# Patient Record
Sex: FEMALE | Race: WHITE | Employment: OTHER | URBAN - METROPOLITAN AREA
[De-identification: names, ages, dates, MRNs, and addresses within clinical notes are randomized per-mention and may not be internally consistent; named-entity substitution may affect disease eponyms.]

---

## 2017-01-11 ENCOUNTER — INITIAL CONSULT (OUTPATIENT)
Dept: PSYCHOLOGY | Age: 67
End: 2017-01-11

## 2017-01-11 DIAGNOSIS — F41.9 ANXIETY: Primary | ICD-10-CM

## 2017-01-11 PROCEDURE — 90791 PSYCH DIAGNOSTIC EVALUATION: CPT | Performed by: PSYCHOLOGIST

## 2017-01-11 ASSESSMENT — PATIENT HEALTH QUESTIONNAIRE - PHQ9
3. TROUBLE FALLING OR STAYING ASLEEP: 3
1. LITTLE INTEREST OR PLEASURE IN DOING THINGS: 2
SUM OF ALL RESPONSES TO PHQ9 QUESTIONS 1 & 2: 2
5. POOR APPETITE OR OVEREATING: 0
7. TROUBLE CONCENTRATING ON THINGS, SUCH AS READING THE NEWSPAPER OR WATCHING TELEVISION: 0
4. FEELING TIRED OR HAVING LITTLE ENERGY: 2
2. FEELING DOWN, DEPRESSED OR HOPELESS: 0
8. MOVING OR SPEAKING SO SLOWLY THAT OTHER PEOPLE COULD HAVE NOTICED. OR THE OPPOSITE, BEING SO FIGETY OR RESTLESS THAT YOU HAVE BEEN MOVING AROUND A LOT MORE THAN USUAL: 0
SUM OF ALL RESPONSES TO PHQ QUESTIONS 1-9: 7
6. FEELING BAD ABOUT YOURSELF - OR THAT YOU ARE A FAILURE OR HAVE LET YOURSELF OR YOUR FAMILY DOWN: 0
9. THOUGHTS THAT YOU WOULD BE BETTER OFF DEAD, OR OF HURTING YOURSELF: 0

## 2017-02-17 ENCOUNTER — OFFICE VISIT (OUTPATIENT)
Dept: CARDIOLOGY CLINIC | Age: 67
End: 2017-02-17

## 2017-02-17 VITALS
SYSTOLIC BLOOD PRESSURE: 136 MMHG | WEIGHT: 153.4 LBS | HEART RATE: 80 BPM | DIASTOLIC BLOOD PRESSURE: 86 MMHG | BODY MASS INDEX: 24.65 KG/M2 | HEIGHT: 66 IN

## 2017-02-17 DIAGNOSIS — Z98.61 S/P PTCA (PERCUTANEOUS TRANSLUMINAL CORONARY ANGIOPLASTY): Primary | ICD-10-CM

## 2017-02-17 DIAGNOSIS — R26.81 UNSTEADINESS: ICD-10-CM

## 2017-02-17 PROCEDURE — 99214 OFFICE O/P EST MOD 30 MIN: CPT | Performed by: INTERNAL MEDICINE

## 2017-02-17 RX ORDER — HYDROXYCHLOROQUINE SULFATE 200 MG/1
TABLET, FILM COATED ORAL 2 TIMES DAILY
COMMUNITY
End: 2017-04-05

## 2017-02-22 ENCOUNTER — OFFICE VISIT (OUTPATIENT)
Dept: INTERNAL MEDICINE CLINIC | Age: 67
End: 2017-02-22

## 2017-02-22 ENCOUNTER — OFFICE VISIT (OUTPATIENT)
Dept: PSYCHOLOGY | Age: 67
End: 2017-02-22

## 2017-02-22 VITALS
HEART RATE: 95 BPM | WEIGHT: 153 LBS | RESPIRATION RATE: 16 BRPM | BODY MASS INDEX: 24.69 KG/M2 | SYSTOLIC BLOOD PRESSURE: 132 MMHG | DIASTOLIC BLOOD PRESSURE: 72 MMHG

## 2017-02-22 DIAGNOSIS — M06.9 RHEUMATOID ARTHRITIS INVOLVING MULTIPLE SITES, UNSPECIFIED RHEUMATOID FACTOR PRESENCE: ICD-10-CM

## 2017-02-22 DIAGNOSIS — M79.7 FIBROMYALGIA: ICD-10-CM

## 2017-02-22 DIAGNOSIS — F41.1 GAD (GENERALIZED ANXIETY DISORDER): Primary | ICD-10-CM

## 2017-02-22 DIAGNOSIS — I25.10 CORONARY ARTERY DISEASE INVOLVING NATIVE CORONARY ARTERY OF NATIVE HEART WITHOUT ANGINA PECTORIS: ICD-10-CM

## 2017-02-22 DIAGNOSIS — E78.2 MIXED HYPERLIPIDEMIA: ICD-10-CM

## 2017-02-22 DIAGNOSIS — I10 ESSENTIAL HYPERTENSION: ICD-10-CM

## 2017-02-22 DIAGNOSIS — F41.9 ANXIETY: Primary | ICD-10-CM

## 2017-02-22 PROCEDURE — 90832 PSYTX W PT 30 MINUTES: CPT | Performed by: PSYCHOLOGIST

## 2017-02-22 PROCEDURE — 99213 OFFICE O/P EST LOW 20 MIN: CPT | Performed by: INTERNAL MEDICINE

## 2017-02-22 RX ORDER — AZELASTINE 1 MG/ML
1 SPRAY, METERED NASAL 2 TIMES DAILY
Qty: 1 BOTTLE | Refills: 3 | Status: SHIPPED | OUTPATIENT
Start: 2017-02-22 | End: 2017-04-05

## 2017-02-22 RX ORDER — LABETALOL 300 MG/1
300 TABLET, FILM COATED ORAL 2 TIMES DAILY
Qty: 60 TABLET | Refills: 3 | Status: SHIPPED | OUTPATIENT
Start: 2017-02-22 | End: 2017-04-05

## 2017-02-22 RX ORDER — BUSPIRONE HYDROCHLORIDE 10 MG/1
10 TABLET ORAL 3 TIMES DAILY
Qty: 90 TABLET | Refills: 1 | Status: SHIPPED | OUTPATIENT
Start: 2017-02-22 | End: 2017-09-28 | Stop reason: SDUPTHER

## 2017-02-22 ASSESSMENT — ENCOUNTER SYMPTOMS
EYE PAIN: 0
ABDOMINAL PAIN: 0
SORE THROAT: 0
DIARRHEA: 0
SHORTNESS OF BREATH: 0
BACK PAIN: 0
CONSTIPATION: 0
COUGH: 0
WHEEZING: 0

## 2017-03-03 ENCOUNTER — HOSPITAL ENCOUNTER (OUTPATIENT)
Dept: GENERAL RADIOLOGY | Age: 67
Discharge: HOME | End: 2017-03-03
Attending: INTERNAL MEDICINE | Admitting: INTERNAL MEDICINE

## 2017-03-03 ENCOUNTER — PROCEDURE VISIT (OUTPATIENT)
Dept: CARDIOLOGY CLINIC | Age: 67
End: 2017-03-03

## 2017-03-03 DIAGNOSIS — Z98.61 S/P PTCA (PERCUTANEOUS TRANSLUMINAL CORONARY ANGIOPLASTY): ICD-10-CM

## 2017-03-03 DIAGNOSIS — R26.81 UNSTEADINESS: Primary | ICD-10-CM

## 2017-03-03 LAB
ALBUMIN SERPL-MCNC: 4.6 GM/DL (ref 3.4–5)
ALP BLD-CCNC: 54 IU/L (ref 40–129)
ALT SERPL-CCNC: 14 U/L (ref 10–40)
ALT SERPL-CCNC: 14 U/L (ref 10–40)
AST SERPL-CCNC: 22 IU/L (ref 15–37)
AST SERPL-CCNC: 23 IU/L (ref 15–37)
BASOPHILS ABSOLUTE: 0 K/CU MM
BASOPHILS RELATIVE PERCENT: 0.5 % (ref 0–1)
BILIRUB SERPL-MCNC: 0.2 MG/DL (ref 0–1)
BILIRUBIN DIRECT: 0.2 MG/DL (ref 0–0.3)
BILIRUBIN, INDIRECT: 0 MG/DL (ref 0–0.7)
BUN BLDV-MCNC: 7 MG/DL (ref 6–23)
CHOLESTEROL: 223 MG/DL
CREAT SERPL-MCNC: 0.8 MG/DL (ref 0.6–1.1)
DIFFERENTIAL TYPE: ABNORMAL
EOSINOPHILS ABSOLUTE: 0.1 K/CU MM
EOSINOPHILS RELATIVE PERCENT: 1.2 % (ref 0–3)
ERYTHROCYTE SEDIMENTATION RATE: 17 MM/HR (ref 0–30)
GFR AFRICAN AMERICAN: >60 ML/MIN/1.73M2
GFR NON-AFRICAN AMERICAN: >60 ML/MIN/1.73M2
HCT VFR BLD CALC: 43 % (ref 37–47)
HDLC SERPL-MCNC: 67 MG/DL
HEMOGLOBIN: 14.2 GM/DL (ref 12.5–16)
IMMATURE NEUTROPHIL %: 0.5 % (ref 0–0.43)
LDL CHOLESTEROL DIRECT: 151 MG/DL
LYMPHOCYTES ABSOLUTE: 2.9 K/CU MM
LYMPHOCYTES RELATIVE PERCENT: 33.9 % (ref 24–44)
MCH RBC QN AUTO: 34.6 PG (ref 27–31)
MCHC RBC AUTO-ENTMCNC: 33 % (ref 32–36)
MCV RBC AUTO: 104.9 FL (ref 78–100)
MONOCYTES ABSOLUTE: 0.6 K/CU MM
MONOCYTES RELATIVE PERCENT: 6.5 % (ref 0–4)
NUCLEATED RBC %: 0 %
PDW BLD-RTO: 12.3 % (ref 11.7–14.9)
PLATELET # BLD: 261 K/CU MM (ref 140–440)
PMV BLD AUTO: 9.2 FL (ref 7.5–11.1)
RBC # BLD: 4.1 M/CU MM (ref 4.2–5.4)
SEGMENTED NEUTROPHILS ABSOLUTE COUNT: 4.8 K/CU MM
SEGMENTED NEUTROPHILS RELATIVE PERCENT: 57.4 % (ref 36–66)
TOTAL CK: 119 IU/L (ref 26–140)
TOTAL IMMATURE NEUTOROPHIL: 0.04 K/CU MM
TOTAL NUCLEATED RBC: 0 K/CU MM
TOTAL PROTEIN: 7.2 GM/DL (ref 6.4–8.2)
TRIGL SERPL-MCNC: 118 MG/DL
TSH HIGH SENSITIVITY: 1.8 UIU/ML (ref 0.27–4.2)
WBC # BLD: 8.4 K/CU MM (ref 4–10.5)

## 2017-03-03 PROCEDURE — 93880 EXTRACRANIAL BILAT STUDY: CPT | Performed by: INTERNAL MEDICINE

## 2017-03-09 ENCOUNTER — TELEPHONE (OUTPATIENT)
Dept: CARDIOLOGY CLINIC | Age: 67
End: 2017-03-09

## 2017-04-04 ENCOUNTER — TELEPHONE (OUTPATIENT)
Dept: INTERNAL MEDICINE CLINIC | Age: 67
End: 2017-04-04

## 2017-04-04 ENCOUNTER — TELEPHONE (OUTPATIENT)
Dept: PSYCHOLOGY | Age: 67
End: 2017-04-04

## 2017-04-05 ENCOUNTER — OFFICE VISIT (OUTPATIENT)
Dept: INTERNAL MEDICINE CLINIC | Age: 67
End: 2017-04-05

## 2017-04-05 VITALS
WEIGHT: 151.8 LBS | HEART RATE: 85 BPM | SYSTOLIC BLOOD PRESSURE: 150 MMHG | DIASTOLIC BLOOD PRESSURE: 88 MMHG | BODY MASS INDEX: 24.5 KG/M2

## 2017-04-05 DIAGNOSIS — I10 ESSENTIAL HYPERTENSION: Primary | ICD-10-CM

## 2017-04-05 DIAGNOSIS — M79.7 FIBROMYALGIA: ICD-10-CM

## 2017-04-05 DIAGNOSIS — M06.9 RHEUMATOID ARTHRITIS INVOLVING MULTIPLE SITES, UNSPECIFIED RHEUMATOID FACTOR PRESENCE: ICD-10-CM

## 2017-04-05 DIAGNOSIS — F41.1 GAD (GENERALIZED ANXIETY DISORDER): ICD-10-CM

## 2017-04-05 DIAGNOSIS — E78.2 MIXED HYPERLIPIDEMIA: ICD-10-CM

## 2017-04-05 DIAGNOSIS — R73.9 HYPERGLYCEMIA: ICD-10-CM

## 2017-04-05 PROCEDURE — 83036 HEMOGLOBIN GLYCOSYLATED A1C: CPT | Performed by: INTERNAL MEDICINE

## 2017-04-05 PROCEDURE — 99214 OFFICE O/P EST MOD 30 MIN: CPT | Performed by: INTERNAL MEDICINE

## 2017-04-05 RX ORDER — GUAIFENESIN 600 MG/1
1200 TABLET, EXTENDED RELEASE ORAL 2 TIMES DAILY
COMMUNITY
End: 2017-09-28

## 2017-04-05 RX ORDER — HYDRALAZINE HYDROCHLORIDE 25 MG/1
25 TABLET, FILM COATED ORAL 2 TIMES DAILY
Qty: 60 TABLET | Refills: 1 | Status: SHIPPED | OUTPATIENT
Start: 2017-04-05 | End: 2017-09-28

## 2017-04-05 RX ORDER — ROSUVASTATIN CALCIUM 20 MG/1
20 TABLET, COATED ORAL DAILY
Qty: 30 TABLET | Refills: 5 | Status: SHIPPED | OUTPATIENT
Start: 2017-04-05 | End: 2017-11-10 | Stop reason: ALTCHOICE

## 2017-04-05 ASSESSMENT — ENCOUNTER SYMPTOMS
ABDOMINAL PAIN: 0
EYE PAIN: 0
SHORTNESS OF BREATH: 0
CONSTIPATION: 0
COUGH: 0
WHEEZING: 0
SORE THROAT: 0
DIARRHEA: 0
BACK PAIN: 0

## 2017-08-22 DIAGNOSIS — F41.1 GAD (GENERALIZED ANXIETY DISORDER): ICD-10-CM

## 2017-09-25 ENCOUNTER — TELEPHONE (OUTPATIENT)
Dept: CARDIOLOGY CLINIC | Age: 67
End: 2017-09-25

## 2017-09-25 RX ORDER — LOSARTAN POTASSIUM 25 MG/1
25 TABLET ORAL 2 TIMES DAILY
Qty: 180 TABLET | Refills: 3 | Status: SHIPPED | OUTPATIENT
Start: 2017-09-25 | End: 2018-10-19 | Stop reason: SDUPTHER

## 2017-09-27 ENCOUNTER — TELEPHONE (OUTPATIENT)
Dept: INTERNAL MEDICINE CLINIC | Age: 67
End: 2017-09-27

## 2017-09-28 ENCOUNTER — OFFICE VISIT (OUTPATIENT)
Dept: INTERNAL MEDICINE CLINIC | Age: 67
End: 2017-09-28

## 2017-09-28 VITALS
DIASTOLIC BLOOD PRESSURE: 80 MMHG | RESPIRATION RATE: 16 BRPM | WEIGHT: 145 LBS | OXYGEN SATURATION: 98 % | HEART RATE: 87 BPM | SYSTOLIC BLOOD PRESSURE: 168 MMHG | BODY MASS INDEX: 23.4 KG/M2

## 2017-09-28 DIAGNOSIS — I10 ESSENTIAL HYPERTENSION: Primary | ICD-10-CM

## 2017-09-28 DIAGNOSIS — E78.2 MIXED HYPERLIPIDEMIA: ICD-10-CM

## 2017-09-28 DIAGNOSIS — M54.42 CHRONIC MIDLINE LOW BACK PAIN WITH LEFT-SIDED SCIATICA: ICD-10-CM

## 2017-09-28 DIAGNOSIS — M06.9 RHEUMATOID ARTHRITIS INVOLVING MULTIPLE SITES, UNSPECIFIED RHEUMATOID FACTOR PRESENCE: ICD-10-CM

## 2017-09-28 DIAGNOSIS — G89.29 CHRONIC MIDLINE LOW BACK PAIN WITH LEFT-SIDED SCIATICA: ICD-10-CM

## 2017-09-28 DIAGNOSIS — M79.7 FIBROMYALGIA: ICD-10-CM

## 2017-09-28 DIAGNOSIS — Z23 NEED FOR INFLUENZA VACCINATION: ICD-10-CM

## 2017-09-28 DIAGNOSIS — F41.1 GAD (GENERALIZED ANXIETY DISORDER): ICD-10-CM

## 2017-09-28 DIAGNOSIS — H92.01 OTALGIA, RIGHT: ICD-10-CM

## 2017-09-28 PROCEDURE — 90471 IMMUNIZATION ADMIN: CPT | Performed by: INTERNAL MEDICINE

## 2017-09-28 PROCEDURE — 90662 IIV NO PRSV INCREASED AG IM: CPT | Performed by: INTERNAL MEDICINE

## 2017-09-28 PROCEDURE — 99214 OFFICE O/P EST MOD 30 MIN: CPT | Performed by: INTERNAL MEDICINE

## 2017-09-28 RX ORDER — ACETAMINOPHEN 500 MG
500 TABLET ORAL EVERY 8 HOURS
Qty: 120 TABLET | Refills: 3 | Status: SHIPPED | OUTPATIENT
Start: 2017-09-28 | End: 2017-11-10 | Stop reason: ALTCHOICE

## 2017-09-28 RX ORDER — IBUPROFEN 600 MG/1
600 TABLET ORAL EVERY 8 HOURS PRN
Qty: 90 TABLET | Refills: 1 | Status: SHIPPED | OUTPATIENT
Start: 2017-09-28 | End: 2019-05-22

## 2017-09-28 RX ORDER — CYCLOBENZAPRINE HCL 5 MG
5 TABLET ORAL 3 TIMES DAILY PRN
Qty: 90 TABLET | Refills: 0 | Status: SHIPPED | OUTPATIENT
Start: 2017-09-28 | End: 2017-11-10 | Stop reason: ALTCHOICE

## 2017-09-28 RX ORDER — SERTRALINE HYDROCHLORIDE 100 MG/1
100 TABLET, FILM COATED ORAL DAILY
Qty: 90 TABLET | Refills: 1 | Status: SHIPPED | OUTPATIENT
Start: 2017-09-28 | End: 2018-03-28 | Stop reason: SDUPTHER

## 2017-09-28 RX ORDER — BUSPIRONE HYDROCHLORIDE 10 MG/1
10 TABLET ORAL 3 TIMES DAILY
Qty: 90 TABLET | Refills: 3 | Status: SHIPPED | OUTPATIENT
Start: 2017-09-28 | End: 2018-03-28 | Stop reason: SDUPTHER

## 2017-09-28 ASSESSMENT — ENCOUNTER SYMPTOMS
WHEEZING: 0
DIARRHEA: 0
SORE THROAT: 0
CONSTIPATION: 0
ABDOMINAL PAIN: 0
EYE PAIN: 0
SHORTNESS OF BREATH: 0
COUGH: 0
BOWEL INCONTINENCE: 0

## 2017-09-30 ASSESSMENT — ENCOUNTER SYMPTOMS: BACK PAIN: 1

## 2017-10-06 ENCOUNTER — HOSPITAL ENCOUNTER (OUTPATIENT)
Dept: GENERAL RADIOLOGY | Age: 67
Discharge: HOME | End: 2017-10-06
Attending: INTERNAL MEDICINE | Admitting: INTERNAL MEDICINE

## 2017-10-06 DIAGNOSIS — M54.42 CHRONIC MIDLINE LOW BACK PAIN WITH LEFT-SIDED SCIATICA: ICD-10-CM

## 2017-10-06 DIAGNOSIS — G89.29 CHRONIC MIDLINE LOW BACK PAIN WITH LEFT-SIDED SCIATICA: ICD-10-CM

## 2017-11-10 ENCOUNTER — OFFICE VISIT (OUTPATIENT)
Dept: CARDIOLOGY CLINIC | Age: 67
End: 2017-11-10

## 2017-11-10 VITALS
HEART RATE: 86 BPM | SYSTOLIC BLOOD PRESSURE: 128 MMHG | DIASTOLIC BLOOD PRESSURE: 84 MMHG | HEIGHT: 67 IN | BODY MASS INDEX: 22.91 KG/M2 | WEIGHT: 146 LBS

## 2017-11-10 DIAGNOSIS — I25.119 CORONARY ARTERY DISEASE INVOLVING NATIVE CORONARY ARTERY OF NATIVE HEART WITH ANGINA PECTORIS (HCC): ICD-10-CM

## 2017-11-10 DIAGNOSIS — Z98.61 S/P PTCA (PERCUTANEOUS TRANSLUMINAL CORONARY ANGIOPLASTY): Primary | ICD-10-CM

## 2017-11-10 PROCEDURE — 99214 OFFICE O/P EST MOD 30 MIN: CPT | Performed by: INTERNAL MEDICINE

## 2017-11-10 RX ORDER — NITROGLYCERIN 400 UG/1
1 SPRAY ORAL EVERY 5 MIN PRN
Qty: 1 BOTTLE | Refills: 3 | Status: SHIPPED | OUTPATIENT
Start: 2017-11-10 | End: 2020-06-18 | Stop reason: SDUPTHER

## 2017-11-10 NOTE — PROGRESS NOTES
CARDIOLOGY NOTE      11/10/2017    RE: Faizan Pompa  (1950)                               TO:  Dr. Eric Birmingham MD      Thank you for involving me in taking care of your  patient Faizan Pompa, who is a  74y.o. year old      female with past medical  history of  CAD, HTN, hyperlipidimea  is  seen today Patient  during this  visit has mild central ch pressure , exertional , nonradiating , recurrent for a few months. .      Vitals:    11/10/17 1544   BP: 128/84   Pulse: 86       Current Outpatient Prescriptions   Medication Sig Dispense Refill    umeclidinium-vilanterol (ANORO ELLIPTA) 62.5-25 MCG/INH AEPB inhaler Inhale 1 puff into the lungs daily 3 each 3    rOPINIRole (REQUIP) 0.5 MG tablet Take 1 tablet by mouth 2 times daily 180 tablet 3    busPIRone (BUSPAR) 10 MG tablet Take 1 tablet by mouth 3 times daily 90 tablet 3    sertraline (ZOLOFT) 100 MG tablet Take 1 tablet by mouth daily 90 tablet 1    ibuprofen (ADVIL;MOTRIN) 600 MG tablet Take 1 tablet by mouth every 8 hours as needed for Pain 90 tablet 1    losartan (COZAAR) 25 MG tablet Take 1 tablet by mouth 2 times daily 180 tablet 3    guaiFENesin (MUCINEX) 600 MG extended release tablet Take 1 tablet by mouth 2 times daily 60 tablet 5    ipratropium-albuterol (DUONEB) 0.5-2.5 (3) MG/3ML SOLN nebulizer solution Inhale 3 mLs into the lungs every 6 hours as needed for Shortness of Breath 360 mL 6    aspirin 81 MG EC tablet Take 81 mg by mouth daily.  nitroGLYCERIN (NITROSTAT) 0.4 MG SL tablet Place 0.4 mg under the tongue every 5 minutes as needed. No current facility-administered medications for this visit. Allergies: Codeine; Elavil [amitriptyline]; Gabapentin; Morphine;  Other; Percocet [oxycodone-acetaminophen]; and Trazodone and nefazodone  Past Medical History:   Diagnosis Date    CAD (coronary artery disease)     [facility] [address]     H/O bilateral oophorectomy ~2006    H/O cardiovascular stress test 04/21/2014    EF 70%, no ischemia, normal LV systolic function, normal perfusion pattern.  H/O cardiovascular stress test 4/16/2015    treadmill    H/O Doppler ultrasound 03/03/2017    carotid - bilateral disease 0-49%    Hx of cardiovascular stress test 11/2011    EF70%    Hx of Doppler ultrasound 10/2012    peripheral duplex normal    Hx of echocardiogram 10/14/2011    EF>55%    Hyperlipidemia     Hypertension     Pulmonary emboli ([address] ~1997 - 80    post op    S/P PTCA (percutaneous transluminal coronary angioplasty) 10/26/2011    ptca with stent to LAD     Past Surgical History:   Procedure Laterality Date    BREAST LUMPECTOMY      COLONOSCOPY  08/29/2016    1 colon polyp    CORONARY ANGIOPLASTY  10826/2011    ptca with stent to LAD    DIAGNOSTIC CARDIAC CATH LAB PROCEDURE  2001    POLYPECTOMY      ROTATOR CUFF REPAIR Left     SMALL INTESTINE SURGERY      JENNY AND BSO       Family History   Problem Relation Age of Onset    Liver Disease Mother     Heart Disease Mother     Liver Cancer Sister      Alcoholic    Cancer Sister      Breast    Stroke Sister      Social History   Substance Use Topics    Smoking status: Current Some Day Smoker     Packs/day: 0.25     Years: 43.00     Types: Cigarettes     Last attempt to quit: 9/1/2014    Smokeless tobacco: Never Used      Comment: Currently 0.5 PPD  Reviewed 2/17/17    Alcohol use 7.2 oz/week     12 Cans of beer per week          Review of systems:  HEENT: Neg  Card:cp   GI;Neg  : Neg  Neuro: Neg  Psych: Neg  Derm: Neg  MS; Neg  All: Documented  Constitutional: Neg    Objective:      Physical Exam:  /84   Pulse 86   Ht 5' 7\" (1.702 m)   Wt 146 lb (66.2 kg)   BMI 22.87 kg/m²   Wt Readings from Last 3 Encounters:   11/10/17 146 lb (66.2 kg)   10/31/17 142 lb (64.4 kg)   09/28/17 145 lb (65.8 kg)     Body mass index is 22.87 kg/m². GENERAL - Alert, oriented, pleasant, in no apparent distress.     Head unremarkable  Eyes  Not injected conjunctiva  ENT  normal mucosa  Neck - Supple. No jugular venous distention noted. No carotid bruits. Cardiovascular  Normal S1 and S2 without obvious murmur or gallop. Extremities - No cyanosis, clubbing, or significant edema. Pulmonary  No respiratory distress. No wheezes or rales. Pulses: Bilateral radial and pedal pulses normal  Abdomen  no tenderness  Musculoskeletal  normal strength  Neurologic    There are  no gross focal neurologic abnormalities. Skin-  No rash  Affect; normal mood    DATA:  Lab Results   Component Value Date    CKTOTAL 119 03/03/2017     BNP:  No results found for: BNP  PT/INR:  No results found for: [employer]  Lab Results   Component Value Date    LABA1C 6.0 03/11/2016    LABA1C 5.5 09/03/2014     Lab Results   Component Value Date    CHOL 223 (H) 03/03/2017    TRIG 118 03/03/2017    HDL 67 03/03/2017    LDLDIRECT 151 (H) 03/03/2017     Lab Results   Component Value Date    ALT 14 03/03/2017    AST 22 03/03/2017     TSH:  No results found for: TSH       QUALITY MEASURES:  CAD:  Yes   CHOL LOWERING:  Yes- if No Why  ANTIPLATELET:  Yes - if No why  BETA BLOCKER    yes,   IF  NO WHY  SMOKING HISTORY no COUNSELLED no  ATRIAL FIBRILLATIONno ANTICOAG: no    Assessment/ Plan:     Patient seen , interviewed and examined                 -     CORONARY ARTERY DISEASE: Patient  currently as per anginal classification has   CCS I - Angina only during strenuous or prolonged physical activity           - changes in  treatment:   no  All CAD tests available in records reviewed. -  Hypertension: Patients blood pressure is normal. Patient is advised about low sodium diet. Present medical regimen will not be changed. -  LIPID MANAGEMENT:  Available lipid  lab data reviewed  and patient was given dietary advice. NCEP- ATP III guidelines reviewed with patient. -   Changes  in medicines made:  No                         PT HAS PCI AND HAS CP NOW.

## 2017-11-21 ENCOUNTER — PROCEDURE VISIT (OUTPATIENT)
Dept: CARDIOLOGY CLINIC | Age: 67
End: 2017-11-21

## 2017-11-21 DIAGNOSIS — Z98.61 S/P PTCA (PERCUTANEOUS TRANSLUMINAL CORONARY ANGIOPLASTY): ICD-10-CM

## 2017-11-21 DIAGNOSIS — I25.119 CORONARY ARTERY DISEASE INVOLVING NATIVE CORONARY ARTERY OF NATIVE HEART WITH ANGINA PECTORIS (HCC): ICD-10-CM

## 2017-11-21 LAB
LV EF: 75 %
LVEF MODALITY: NORMAL

## 2017-11-21 PROCEDURE — 93015 CV STRESS TEST SUPVJ I&R: CPT | Performed by: INTERNAL MEDICINE

## 2017-11-21 PROCEDURE — A9500 TC99M SESTAMIBI: HCPCS | Performed by: INTERNAL MEDICINE

## 2017-11-21 PROCEDURE — 78452 HT MUSCLE IMAGE SPECT MULT: CPT | Performed by: INTERNAL MEDICINE

## 2017-11-22 ENCOUNTER — TELEPHONE (OUTPATIENT)
Dept: CARDIOLOGY CLINIC | Age: 67
End: 2017-11-22

## 2017-11-22 NOTE — TELEPHONE ENCOUNTER
Left message on voice mail for patient to return call regarding results of stress [facility] physician Dr. Ventura Olguin shows no ischemia but treadmill shows ST depression which improved    during rest    ECG portion of stress test is positive for ischemia by diagnostic criteria.    1 Mm ST depression seen in V5 and V6 , completed 7 METS and 6 Mins of    exercise    No infarct or ischemia noted.    Normal EF 75 % with normal ventricular contractility.    Normal stress images and Normal stress test        Recommendation    clinical correlation needed , Normal stress images but abnormal Treadmill    stress ( low risk)

## 2017-11-27 ENCOUNTER — TELEPHONE (OUTPATIENT)
Dept: INTERNAL MEDICINE CLINIC | Age: 67
End: 2017-11-27

## 2017-11-30 ENCOUNTER — OFFICE VISIT (OUTPATIENT)
Dept: CARDIOLOGY CLINIC | Age: 67
End: 2017-11-30

## 2017-11-30 VITALS
HEART RATE: 84 BPM | HEIGHT: 67 IN | BODY MASS INDEX: 23.04 KG/M2 | DIASTOLIC BLOOD PRESSURE: 80 MMHG | WEIGHT: 146.8 LBS | SYSTOLIC BLOOD PRESSURE: 138 MMHG

## 2017-11-30 DIAGNOSIS — Z98.61 S/P PTCA (PERCUTANEOUS TRANSLUMINAL CORONARY ANGIOPLASTY): Primary | ICD-10-CM

## 2017-11-30 DIAGNOSIS — I25.119 CORONARY ARTERY DISEASE INVOLVING NATIVE CORONARY ARTERY OF NATIVE HEART WITH ANGINA PECTORIS (HCC): ICD-10-CM

## 2017-11-30 PROCEDURE — 99214 OFFICE O/P EST MOD 30 MIN: CPT | Performed by: INTERNAL MEDICINE

## 2018-03-01 ENCOUNTER — OFFICE VISIT (OUTPATIENT)
Dept: CARDIOLOGY CLINIC | Age: 68
End: 2018-03-01

## 2018-03-01 VITALS
DIASTOLIC BLOOD PRESSURE: 82 MMHG | WEIGHT: 147.8 LBS | SYSTOLIC BLOOD PRESSURE: 132 MMHG | BODY MASS INDEX: 23.75 KG/M2 | HEIGHT: 66 IN | HEART RATE: 88 BPM

## 2018-03-01 DIAGNOSIS — Z98.61 S/P PTCA (PERCUTANEOUS TRANSLUMINAL CORONARY ANGIOPLASTY): Primary | ICD-10-CM

## 2018-03-01 PROCEDURE — 99213 OFFICE O/P EST LOW 20 MIN: CPT | Performed by: INTERNAL MEDICINE

## 2018-03-01 NOTE — PROGRESS NOTES
CARDIOLOGY NOTE      3/1/2018    RE: Thony Muñoz  (1950)                               TO:  Dr. Evie Cavazos MD      Thank you for involving me in taking care of your  patient Thony Muñoz, who is a  74y.o. year old      female with past medical  history of  CAD, HTN, hyperlipidimea  is  seen today  Pt still has mild exertional CP with mild SOB  For a few weeks. Vitals:    03/01/18 1504   BP: 132/82   Pulse: 88       Current Outpatient Prescriptions   Medication Sig Dispense Refill    Varenicline Tartrate (CHANTIX PO) Take by mouth      umeclidinium-vilanterol (ANORO ELLIPTA) 62.5-25 MCG/INH AEPB inhaler Inhale 1 puff into the lungs daily 3 each 3    rOPINIRole (REQUIP) 0.5 MG tablet Take 1 tablet by mouth 2 times daily 180 tablet 3    nitroGLYCERIN (NITROLINGUAL) 0.4 MG/SPRAY 0.4 mg spray Place 1 spray under the tongue every 5 minutes as needed for Chest pain 1 Bottle 3    busPIRone (BUSPAR) 10 MG tablet Take 1 tablet by mouth 3 times daily 90 tablet 3    sertraline (ZOLOFT) 100 MG tablet Take 1 tablet by mouth daily 90 tablet 1    ibuprofen (ADVIL;MOTRIN) 600 MG tablet Take 1 tablet by mouth every 8 hours as needed for Pain 90 tablet 1    losartan (COZAAR) 25 MG tablet Take 1 tablet by mouth 2 times daily 180 tablet 3    guaiFENesin (MUCINEX) 600 MG extended release tablet Take 1 tablet by mouth 2 times daily 60 tablet 5    aspirin 81 MG EC tablet Take 81 mg by mouth daily. No current facility-administered medications for this visit. Allergies: Codeine; Elavil [amitriptyline]; Gabapentin; Morphine; Other; Percocet [oxycodone-acetaminophen]; and Trazodone and nefazodone  Past Medical History:   Diagnosis Date    CAD (coronary artery disease)     [facility]congestive heart failure) ([facility] [address]     H/O bilateral oophorectomy ~2006    H/O cardiovascular stress test 04/21/2014    EF 70%, no ischemia, normal LV systolic function, normal perfusion pattern.     H/O oriented, pleasant, in no apparent distress. Head unremarkable  Eyes  Not injected conjunctiva  ENT  normal mucosa  Neck - Supple. No jugular venous distention noted. No carotid bruits. Cardiovascular  Normal S1 and S2 without obvious murmur or gallop. Extremities - No cyanosis, clubbing, or significant edema. Pulmonary  No respiratory distress. No wheezes or rales. Pulses: Bilateral radial and pedal pulses normal  Abdomen  no tenderness  Musculoskeletal  normal strength  Neurologic    There are  no gross focal neurologic abnormalities. Skin-  No rash  Affect; normal mood    DATA:  Lab Results   Component Value Date    CKTOTAL 119 03/03/2017     BNP:  No results found for: BNP  PT/INR:  No results found for: PTINR  Lab Results   Component Value Date    LABA1C 6.0 03/11/2016    LABA1C 5.5 09/03/2014     Lab Results   Component Value Date    CHOL 223 (H) 03/03/2017    TRIG 118 03/03/2017    HDL 67 03/03/2017    LDLDIRECT 151 (H) 03/03/2017     Lab Results   Component Value Date    ALT 14 03/03/2017    AST 22 03/03/2017     TSH:  No results found for: TSH           Assessment/ Plan:     Patient seen , interviewed and examined                 -     CORONARY ARTERY DISEASE: Patient  currently as per anginal classification has no CP           - changes in  treatment:   no  All CAD tests available in records reviewed. -  Hypertension: Patients blood pressure is normal. Patient is advised about low sodium diet. Present medical regimen will not be changed. -  LIPID MANAGEMENT:  Available lipid  lab data reviewed  and patient was given dietary advice. NCEP- ATP III guidelines reviewed with patient. -   Changes  in medicines made: No               - Schedule [facility]  .

## 2018-03-01 NOTE — LETTER
Manju Strange     LEFT HEART CATHETERIZATION WITH POSSIBLE PERCUTANEOUS CORONARY INTERVENTION     Patient Name: Henri Hurst   : 1950   MRN# [id number]    Date of Procedure: 3/8/18 Time: 2pm Arrival Time: 12pm    The catheterization and angiogram are usually outpatient procedures, however if stenting is needed you will stay overnight. You will need to be at the hospital two hours before the procedure. You will need to arrange for someone to drive you home. You will go to registration in the main lobby. HOSPITAL:  [facility])  Call to Pre-[place] at: [phone] 1-2 days before your procedure. Please have blood work and chest-x-ray done 1 to 2 days before procedure at    [facility]. X Please do not have anything by mouth after midnight prior to or 8 hours before the procedure. X You may take your medications with a sip of water in the morning before your procedure or  take them with you. Patient Signature:  _________________________ Staff Signature: Maximino Martinez     Staff Given Instructions:_______________________________                                        Manju Strange     Patient Name: Henri Hurst   : 1950   MRN# [id number]    Date of Procedure: 3/8/18 Time: 2pm      LEFT HEART CATHETERIZATION WITH POSSIBLE PERCUTANEOUS CORONARY INTERVENTION        X Chest x-Ray PA & Lateral View        X Type & Screen     X CBC  X BMP  X PT  X PTT            ? PLEASE CALL ABNORMAL RESULTS TO THE  PHYSICIAN? ATTENTION PATIENT: Pretesting is to be done before the cath. You do not have to fast for the lab work. You must go to the [facility] behind [facility] at [address] Mahendra Ambrose to have this lab work done.   Phone: [phone] Hours: 7:00 am to 5:00 pm       PHYSICIAN SIGNATURE:      DATE:

## 2018-03-01 NOTE — PROGRESS NOTES
Pt here in office & educated on 3/1/18 for Dx: chest pain and shortness of breath, scheduled for [facility] 3/8/18 @ 2pm, w/arrival @ 12pm, @ [facility]; risks explained; & consents signed. Pre-admission orders given to pt for labs & CXR due 3/6/18 @ [facility]. Instructions given to pt to:  NPO after midnight night before procedure; Call hospital @ [phone] to pre-register; May take rest of morning meds. am of procedure; & pt voiced understanding. Copies of consent, pre-testing orders, & info. sheet given to [facility] for scanning.

## 2018-03-01 NOTE — PATIENT INSTRUCTIONS
Please remember to bring all medication bottles or a medication list with you to your appointment. If you have any questions, please call our office at [phone].

## 2018-03-06 ENCOUNTER — HOSPITAL ENCOUNTER (OUTPATIENT)
Dept: GENERAL RADIOLOGY | Age: 68
Discharge: HOME | End: 2018-03-06
Attending: INTERNAL MEDICINE | Admitting: INTERNAL MEDICINE

## 2018-03-06 DIAGNOSIS — Z01.811 PRE-OP CHEST EXAM: ICD-10-CM

## 2018-03-06 LAB
ANION GAP SERPL CALCULATED.3IONS-SCNC: 13 MMOL/L (ref 4–16)
APTT: 27.4 SECONDS (ref 21.2–33)
BASOPHILS ABSOLUTE: 0.1 K/CU MM
BASOPHILS RELATIVE PERCENT: 0.6 % (ref 0–1)
BUN BLDV-MCNC: 11 MG/DL (ref 6–23)
CALCIUM SERPL-MCNC: 9 MG/DL (ref 8.3–10.6)
CHLORIDE BLD-SCNC: 93 MMOL/L (ref 99–110)
CO2: 26 MMOL/L (ref 21–32)
CREAT SERPL-MCNC: 0.9 MG/DL (ref 0.6–1.1)
DIFFERENTIAL TYPE: ABNORMAL
EOSINOPHILS ABSOLUTE: 0.2 K/CU MM
EOSINOPHILS RELATIVE PERCENT: 1.8 % (ref 0–3)
GFR AFRICAN AMERICAN: >60 ML/MIN/1.73M2
GFR NON-AFRICAN AMERICAN: >60 ML/MIN/1.73M2
GLUCOSE BLD-MCNC: 99 MG/DL (ref 70–99)
HCT VFR BLD CALC: 39.4 % (ref 37–47)
HEMOGLOBIN: 13.7 GM/DL (ref 12.5–16)
IMMATURE NEUTROPHIL %: 0.2 % (ref 0–0.43)
INR BLD: 0.91 INDEX
LYMPHOCYTES ABSOLUTE: 2.7 K/CU MM
LYMPHOCYTES RELATIVE PERCENT: 27.2 % (ref 24–44)
MCH RBC QN AUTO: 35.6 PG (ref 27–31)
MCHC RBC AUTO-ENTMCNC: 34.8 % (ref 32–36)
MCV RBC AUTO: 102.3 FL (ref 78–100)
MONOCYTES ABSOLUTE: 0.7 K/CU MM
MONOCYTES RELATIVE PERCENT: 6.8 % (ref 0–4)
NUCLEATED RBC %: 0 %
PDW BLD-RTO: 11.9 % (ref 11.7–14.9)
PLATELET # BLD: 265 K/CU MM (ref 140–440)
PMV BLD AUTO: 9 FL (ref 7.5–11.1)
POTASSIUM SERPL-SCNC: 4.2 MMOL/L (ref 3.5–5.1)
PROTHROMBIN TIME: 10.4 SECONDS (ref 9.12–12.5)
RBC # BLD: 3.85 M/CU MM (ref 4.2–5.4)
SEGMENTED NEUTROPHILS ABSOLUTE COUNT: 6.2 K/CU MM
SEGMENTED NEUTROPHILS RELATIVE PERCENT: 63.4 % (ref 36–66)
SODIUM BLD-SCNC: 132 MMOL/L (ref 135–145)
TOTAL IMMATURE NEUTOROPHIL: 0.02 K/CU MM
TOTAL NUCLEATED RBC: 0 K/CU MM
WBC # BLD: 9.8 K/CU MM (ref 4–10.5)

## 2018-03-08 PROBLEM — I25.10 ASCVD (ARTERIOSCLEROTIC CARDIOVASCULAR DISEASE): Status: ACTIVE | Noted: 2018-03-08

## 2018-03-14 ENCOUNTER — OFFICE VISIT (OUTPATIENT)
Dept: CARDIOLOGY CLINIC | Age: 68
End: 2018-03-14

## 2018-03-14 VITALS
DIASTOLIC BLOOD PRESSURE: 82 MMHG | HEART RATE: 69 BPM | BODY MASS INDEX: 23.73 KG/M2 | WEIGHT: 147 LBS | SYSTOLIC BLOOD PRESSURE: 132 MMHG

## 2018-03-14 DIAGNOSIS — I25.10 CORONARY ARTERY DISEASE, ANGINA PRESENCE UNSPECIFIED, UNSPECIFIED VESSEL OR LESION TYPE, UNSPECIFIED WHETHER NATIVE OR TRANSPLANTED HEART: Primary | ICD-10-CM

## 2018-03-14 PROCEDURE — 93000 ELECTROCARDIOGRAM COMPLETE: CPT | Performed by: INTERNAL MEDICINE

## 2018-03-14 PROCEDURE — 99213 OFFICE O/P EST LOW 20 MIN: CPT | Performed by: INTERNAL MEDICINE

## 2018-03-14 NOTE — PROGRESS NOTES
CARDIOLOGY NOTE      3/14/2018    RE: Eleanor German  (1950)                               TO:  Dr. Caitlin Enrique MD      Thank you for involving me in taking care of your  patient Eleanor German, who is a  74y.o. year old      female with past medical  history of  CAD, HTN, hyperlipidimea  is  seen today   SP  PCI of LAD and RCA. Vitals:    03/14/18 1113   BP: 132/82   Pulse:        Current Outpatient Prescriptions   Medication Sig Dispense Refill    prasugrel (EFFIENT) 10 MG TABS Take 1 tablet by mouth daily 90 tablet 3    Varenicline Tartrate (CHANTIX PO) Take by mouth      umeclidinium-vilanterol (ANORO ELLIPTA) 62.5-25 MCG/INH AEPB inhaler Inhale 1 puff into the lungs daily 3 each 3    rOPINIRole (REQUIP) 0.5 MG tablet Take 1 tablet by mouth 2 times daily 180 tablet 3    busPIRone (BUSPAR) 10 MG tablet Take 1 tablet by mouth 3 times daily 90 tablet 3    sertraline (ZOLOFT) 100 MG tablet Take 1 tablet by mouth daily 90 tablet 1    losartan (COZAAR) 25 MG tablet Take 1 tablet by mouth 2 times daily 180 tablet 3    guaiFENesin (MUCINEX) 600 MG extended release tablet Take 1 tablet by mouth 2 times daily 60 tablet 5    aspirin 81 MG EC tablet Take 81 mg by mouth daily.  nitroGLYCERIN (NITROLINGUAL) 0.4 MG/SPRAY 0.4 mg spray Place 1 spray under the tongue every 5 minutes as needed for Chest pain 1 Bottle 3    ibuprofen (ADVIL;MOTRIN) 600 MG tablet Take 1 tablet by mouth every 8 hours as needed for Pain 90 tablet 1     No current facility-administered medications for this visit. Allergies: Codeine; Elavil [amitriptyline]; Gabapentin; Morphine;  Other; Percocet [oxycodone-acetaminophen]; and Trazodone and nefazodone  Past Medical History:   Diagnosis Date    CAD (coronary artery disease)     CHF (congestive heart failure) ([facility] [address]     H/O bilateral oophorectomy ~2006    H/O cardiovascular stress test 04/21/2014    EF 70%, no ischemia, normal LV systolic function, normal

## 2018-03-22 ENCOUNTER — PROCEDURE VISIT (OUTPATIENT)
Dept: CARDIOLOGY CLINIC | Age: 68
End: 2018-03-22

## 2018-03-22 VITALS
DIASTOLIC BLOOD PRESSURE: 70 MMHG | WEIGHT: 147 LBS | HEIGHT: 66 IN | BODY MASS INDEX: 23.63 KG/M2 | HEART RATE: 85 BPM | SYSTOLIC BLOOD PRESSURE: 132 MMHG

## 2018-03-22 DIAGNOSIS — I25.10 CORONARY ARTERY DISEASE, ANGINA PRESENCE UNSPECIFIED, UNSPECIFIED VESSEL OR LESION TYPE, UNSPECIFIED WHETHER NATIVE OR TRANSPLANTED HEART: ICD-10-CM

## 2018-03-22 DIAGNOSIS — Z98.61 S/P PTCA (PERCUTANEOUS TRANSLUMINAL CORONARY ANGIOPLASTY): ICD-10-CM

## 2018-03-22 PROCEDURE — 93015 CV STRESS TEST SUPVJ I&R: CPT | Performed by: INTERNAL MEDICINE

## 2018-03-22 RX ORDER — VARENICLINE TARTRATE 25 MG
KIT ORAL
Qty: 53 TABLET | Refills: 3 | Status: SHIPPED | OUTPATIENT
Start: 2018-03-22 | End: 2018-07-02

## 2018-03-22 NOTE — PROGRESS NOTES
GXT completed. Pt also requested if  would be prescribe her on Chantix. Asked him when he reviewed her GXT EKGs & okay to refill. Informed pt of this & she voiced understanding. Electronically submitted Chantix starter dosage Rx to [facility]. per  & pt's request.

## 2018-03-28 ENCOUNTER — HOSPITAL ENCOUNTER (OUTPATIENT)
Dept: GENERAL RADIOLOGY | Age: 68
Discharge: HOME | End: 2018-03-28
Attending: FAMILY MEDICINE | Admitting: FAMILY MEDICINE

## 2018-03-28 ENCOUNTER — OFFICE VISIT (OUTPATIENT)
Dept: INTERNAL MEDICINE CLINIC | Age: 68
End: 2018-03-28

## 2018-03-28 VITALS
BODY MASS INDEX: 23.63 KG/M2 | HEART RATE: 85 BPM | SYSTOLIC BLOOD PRESSURE: 136 MMHG | DIASTOLIC BLOOD PRESSURE: 88 MMHG | WEIGHT: 146.4 LBS | OXYGEN SATURATION: 96 % | RESPIRATION RATE: 14 BRPM

## 2018-03-28 DIAGNOSIS — F41.1 GAD (GENERALIZED ANXIETY DISORDER): ICD-10-CM

## 2018-03-28 DIAGNOSIS — R61 NIGHT SWEATS: ICD-10-CM

## 2018-03-28 DIAGNOSIS — I10 ESSENTIAL HYPERTENSION: ICD-10-CM

## 2018-03-28 DIAGNOSIS — M06.9 RHEUMATOID ARTHRITIS INVOLVING MULTIPLE SITES, UNSPECIFIED RHEUMATOID FACTOR PRESENCE: Primary | ICD-10-CM

## 2018-03-28 LAB
ALBUMIN SERPL-MCNC: 4.4 GM/DL (ref 3.4–5)
ALP BLD-CCNC: 55 IU/L (ref 40–129)
ALT SERPL-CCNC: 11 U/L (ref 10–40)
ANION GAP SERPL CALCULATED.3IONS-SCNC: 11 MMOL/L (ref 4–16)
AST SERPL-CCNC: 20 IU/L (ref 15–37)
BILIRUB SERPL-MCNC: 0.2 MG/DL (ref 0–1)
BUN BLDV-MCNC: 8 MG/DL (ref 6–23)
CALCIUM SERPL-MCNC: 9.4 MG/DL (ref 8.3–10.6)
CHLORIDE BLD-SCNC: 97 MMOL/L (ref 99–110)
CO2: 27 MMOL/L (ref 21–32)
CREAT SERPL-MCNC: 0.9 MG/DL (ref 0.6–1.1)
FOLLICLE STIMULATING HORMONE: 45.1 MLU/ML
GFR AFRICAN AMERICAN: >60 ML/MIN/1.73M2
GFR NON-AFRICAN AMERICAN: >60 ML/MIN/1.73M2
GLUCOSE BLD-MCNC: 85 MG/DL (ref 70–99)
LH: 25.8 MIU/L
POTASSIUM SERPL-SCNC: 4.3 MMOL/L (ref 3.5–5.1)
PROLACTIN: 36.2 NG/ML
SODIUM BLD-SCNC: 135 MMOL/L (ref 135–145)
TOTAL PROTEIN: 7.3 GM/DL (ref 6.4–8.2)
TSH HIGH SENSITIVITY: 2.47 UIU/ML (ref 0.27–4.2)

## 2018-03-28 PROCEDURE — 99214 OFFICE O/P EST MOD 30 MIN: CPT | Performed by: FAMILY MEDICINE

## 2018-03-28 RX ORDER — SERTRALINE HYDROCHLORIDE 100 MG/1
100 TABLET, FILM COATED ORAL DAILY
Qty: 90 TABLET | Refills: 1 | Status: SHIPPED | OUTPATIENT
Start: 2018-03-28 | End: 2018-11-05 | Stop reason: SDUPTHER

## 2018-03-28 RX ORDER — CYCLOBENZAPRINE HCL 5 MG
5 TABLET ORAL NIGHTLY
Qty: 90 TABLET | Refills: 0 | Status: SHIPPED | OUTPATIENT
Start: 2018-03-28 | End: 2018-12-17

## 2018-03-28 RX ORDER — BUSPIRONE HYDROCHLORIDE 10 MG/1
10 TABLET ORAL 3 TIMES DAILY
Qty: 90 TABLET | Refills: 1 | Status: SHIPPED | OUTPATIENT
Start: 2018-03-28 | End: 2018-11-05 | Stop reason: SDUPTHER

## 2018-03-28 ASSESSMENT — ENCOUNTER SYMPTOMS
NAUSEA: 0
EYE DISCHARGE: 0
BLOOD IN STOOL: 0
DIARRHEA: 0
SORE THROAT: 0
SHORTNESS OF BREATH: 0
VOMITING: 0
SINUS PRESSURE: 0
BACK PAIN: 1
COUGH: 0

## 2018-03-28 ASSESSMENT — PATIENT HEALTH QUESTIONNAIRE - PHQ9
1. LITTLE INTEREST OR PLEASURE IN DOING THINGS: 0
2. FEELING DOWN, DEPRESSED OR HOPELESS: 0
SUM OF ALL RESPONSES TO PHQ QUESTIONS 1-9: 0
SUM OF ALL RESPONSES TO PHQ9 QUESTIONS 1 & 2: 0

## 2018-03-28 NOTE — PROGRESS NOTES
Brendon Adames  1950  74 y.o. SUBJECT MIGUELINA:    Chief Complaint   Patient presents with    Medication Refill     pt would like to start Flexeril again for back pain     Other     wants to discuss X-ray results      Patient in for results o her x-rays. Sh is using ibuprofen every once in a while. Patient has been taking flexeril. HPI  Patient in to establish care with new provider. She has a past medical history of hypertension, hyperlipidemia, CHF, coronary artery disease she is status post a PTCA. He should also with generalized anxiety disorder. She presents today wanting refills on her Flexeril for her back. As a history of arthritis and osteopenia. Patient reports she has not seen a rheumatologist in more than a year and she's been off of steroids and methotrexate since that time. Patient had x-rays done last October per Dr. Josiah Rodriguez that showed degenerative disc disease and osteopenia of her lumbar spine. She did not get results of the x-rays that she was unable to follow-up when she showed up for her appointment she was late. Patient is requesting refill on her Zoloft as well. He denies any suicidal or homicidal ideations. Patient also complained of night sweats that she's had for the last couple months. She has gone through menopausal symptoms already. Review of Systems   Constitutional: Positive for fatigue. Negative for chills and fever. HENT: Negative for congestion, ear pain, sinus pressure and sore throat. Eyes: Negative for discharge and visual disturbance. Respiratory: Negative for cough and shortness of breath. Cardiovascular: Negative for chest pain and leg swelling. Gastrointestinal: Negative for blood in stool, diarrhea, nausea and vomiting. Endocrine: Negative for polydipsia. Genitourinary: Negative for dysuria, frequency and hematuria. Musculoskeletal: Positive for arthralgias and back pain. Negative for gait problem. Skin: Negative for rash. Allergic/Immunologic: Negative for environmental allergies and food allergies. Neurological: Negative for dizziness and headaches. Psychiatric/Behavioral: Positive for sleep disturbance. Negative for behavioral problems and suicidal ideas. The patient is nervous/anxious. Past Medical, Family, and Social History have been reviewed today. OBJECTIVE:     /88   Pulse 85   Resp 14   Wt 146 lb 6.4 oz (66.4 kg)   SpO2 96%   BMI 23.63 kg/m²         Physical Exam   Constitutional: She is oriented to person, place, and time. She appears well-developed and well-nourished. HENT:   Head: Normocephalic and atraumatic. Right Ear: External ear normal.   Left Ear: External ear normal.   Nose: Nose normal.   Mouth/Throat: Oropharynx is clear and moist.   Eyes: Conjunctivae and EOM are normal. Pupils are equal, round, and reactive to light. No scleral icterus. Neck: Normal range of motion. Neck supple. Cardiovascular: Normal rate, regular rhythm, normal heart sounds and intact distal pulses. Exam reveals no gallop and no friction rub. No murmur heard. Pulmonary/Chest: Effort normal. No respiratory distress. She has decreased breath sounds. She has no wheezes. She has no rales. Musculoskeletal: Normal range of motion. Neurological: She is alert and oriented to person, place, and time. Skin: Skin is warm and dry. No rash noted. Psychiatric: She has a normal mood and affect. Her behavior is normal. Judgment and thought content normal.       Controlled Substances Monitoring: The Prescription Monitoring Report for this patient was reviewed today. (Jojo Mora MD)    No signs of potential drug abuse or diversion identified. Jojo Mora MD)        ASSESSMENT/ PLAN:    1. Rheumatoid arthritis involving multiple sites, unspecified rheumatoid factor presence ([facility] [address]  Patient has not been seen a rheumatologist for more than a year.   She will continue with Tylenol and will add Flexeril at bedtime. Patient has been advised that she'll need to be seen every 90 days to continue this regimen. - cyclobenzaprine (FLEXERIL) 5 MG tablet; Take 1 tablet by mouth nightly  Dispense: 90 tablet; Refill: 0    2. Essential hypertension  Blood pressure is at goal.  We'll continue current medications. Patient's on Cozaar daily. 3. STANLEY (generalized anxiety disorder)  Patient requests refill on her BuSpar and Zoloft today have ordered these with refills. Patient denies any suicidal or homicidal ideation.  - busPIRone (BUSPAR) 10 MG tablet; Take 1 tablet by mouth 3 times daily  Dispense: 90 tablet; Refill: 1  - sertraline (ZOLOFT) 100 MG tablet; Take 1 tablet by mouth daily  Dispense: 90 tablet; Refill: 1    4. Night sweats  We'll get some lab studies to evaluate the night sweats the patients having periods discussed with her it is not safe to resume estrogens after she is already gone through menopause. We'll advise her of results when available. Discussed the possibility of Cymbalta her Lexapro to help with some of her symptoms as well. - TSH with Reflex; Future  - COMPREHENSIVE METABOLIC PANEL; Future  - FOLLICLE STIMULATING HORMONE; Future  - PROLACTIN; Future  - LUTEINIZING HORMONE; Future        Orders Placed This Encounter   Procedures    TSH with Reflex     Standing Status:   Future     Standing Expiration Date:   3/28/2019    COMPREHENSIVE METABOLIC PANEL     Standing Status:   Future     Standing Expiration Date:   0/25/4758    FOLLICLE STIMULATING HORMONE     Standing Status:   Future     Standing Expiration Date:   3/28/2019    PROLACTIN     Standing Status:   Future     Standing Expiration Date:   3/28/2019    LUTEINIZING HORMONE     Standing Status:   Future     Standing Expiration Date:   3/28/2019       No Follow-up on file.

## 2018-03-28 NOTE — PATIENT INSTRUCTIONS
buy these pads in sports or clothing stores. · Let your shoes dry out for a day after wearing them. If possible, set them in a place where the sun will shine on them. That will help kill the bacteria that cause the smell. · Change your socks at least 1 time a day. Wash your socks after each wearing. · Use foot powder or talc in your shoes and socks and on your feet. Put inserts in your shoes to absorb some of the sweat. Go barefoot for a while each day to let your feet dry out. · Limit hot drinks, such as coffee and tea, which make you sweat more. When should you call for help? Watch closely for changes in your health, and be sure to contact your doctor if:  ? · You continue to sweat too much, and it bothers you. Where can you learn more? Go to https://Riiidpepiceweb.Findline. org and sign in to your [employer] account. Enter C587 in the [employer] box to learn more about \"Abnormal Sweating: Care Instructions. \"     If you do not have an account, please click on the \"Sign Up Now\" link. Current as of: March 20, 2017  Content Version: 11.5  © 7542-2428 [employer]wise, [employer]. Care instructions adapted under license by [facility] ([facility]). If you have questions about a medical condition or this instruction, always ask your healthcare professional. [address] any warranty or liability for your use of this information.

## 2018-04-02 ENCOUNTER — TELEPHONE (OUTPATIENT)
Dept: INTERNAL MEDICINE CLINIC | Age: 68
End: 2018-04-02

## 2018-05-07 ENCOUNTER — HOSPITAL ENCOUNTER (OUTPATIENT)
Dept: OTHER | Age: 68
Discharge: HOME | End: 2018-05-31
Attending: INTERNAL MEDICINE | Admitting: INTERNAL MEDICINE

## 2018-06-01 ENCOUNTER — HOSPITAL ENCOUNTER (OUTPATIENT)
Dept: OTHER | Age: 68
Discharge: HOME | End: 2018-06-30
Attending: INTERNAL MEDICINE | Admitting: INTERNAL MEDICINE

## 2018-06-18 ENCOUNTER — OFFICE VISIT (OUTPATIENT)
Dept: CARDIOLOGY CLINIC | Age: 68
End: 2018-06-18

## 2018-06-18 VITALS
DIASTOLIC BLOOD PRESSURE: 74 MMHG | SYSTOLIC BLOOD PRESSURE: 134 MMHG | WEIGHT: 149 LBS | HEART RATE: 76 BPM | HEIGHT: 66 IN | BODY MASS INDEX: 23.95 KG/M2

## 2018-06-18 DIAGNOSIS — Z98.61 S/P PTCA (PERCUTANEOUS TRANSLUMINAL CORONARY ANGIOPLASTY): Primary | ICD-10-CM

## 2018-06-18 DIAGNOSIS — I25.10 ASCVD (ARTERIOSCLEROTIC CARDIOVASCULAR DISEASE): ICD-10-CM

## 2018-06-18 DIAGNOSIS — E78.2 MIXED HYPERLIPIDEMIA: ICD-10-CM

## 2018-06-18 PROCEDURE — 99214 OFFICE O/P EST MOD 30 MIN: CPT | Performed by: INTERNAL MEDICINE

## 2018-06-18 RX ORDER — VARENICLINE TARTRATE 1 MG/1
1 TABLET, FILM COATED ORAL 2 TIMES DAILY
Qty: 60 TABLET | Refills: 3 | Status: SHIPPED | OUTPATIENT
Start: 2018-06-18 | End: 2018-11-20

## 2018-07-02 ENCOUNTER — OFFICE VISIT (OUTPATIENT)
Dept: INTERNAL MEDICINE CLINIC | Age: 68
End: 2018-07-02

## 2018-07-02 VITALS
RESPIRATION RATE: 16 BRPM | HEART RATE: 80 BPM | WEIGHT: 148.6 LBS | OXYGEN SATURATION: 99 % | DIASTOLIC BLOOD PRESSURE: 84 MMHG | BODY MASS INDEX: 23.98 KG/M2 | SYSTOLIC BLOOD PRESSURE: 134 MMHG

## 2018-07-02 DIAGNOSIS — M06.9 RHEUMATOID ARTHRITIS INVOLVING MULTIPLE SITES, UNSPECIFIED RHEUMATOID FACTOR PRESENCE: Primary | ICD-10-CM

## 2018-07-02 DIAGNOSIS — M79.7 FIBROMYALGIA: ICD-10-CM

## 2018-07-02 PROCEDURE — 99213 OFFICE O/P EST LOW 20 MIN: CPT | Performed by: FAMILY MEDICINE

## 2018-07-02 ASSESSMENT — ENCOUNTER SYMPTOMS
BACK PAIN: 0
BLOOD IN STOOL: 0
COUGH: 0
DIARRHEA: 0
VOMITING: 0
EYE DISCHARGE: 0
SINUS PRESSURE: 0
SHORTNESS OF BREATH: 0
SORE THROAT: 0
NAUSEA: 0

## 2018-07-03 NOTE — ASSESSMENT & PLAN NOTE
Patient with Fibromyalgia. She reports nothing seems to help and she will follow up with Dr. Earl Zamorano for pain management.

## 2018-07-03 NOTE — ASSESSMENT & PLAN NOTE
[facility] and reports she has no one who would be able to take her. She does not want to follow up with rheumatologist here in town. She does not want to take prednisone. Will continue to monitor for now. Patient wishes to seek alternative to pain medications.

## 2018-07-31 ENCOUNTER — HOSPITAL ENCOUNTER (OUTPATIENT)
Dept: MRI IMAGING | Age: 68
Discharge: HOME | End: 2018-07-31

## 2018-07-31 DIAGNOSIS — G93.9 BRAIN DISORDER: ICD-10-CM

## 2018-07-31 DIAGNOSIS — G93.9 DISORDER OF BRAIN: ICD-10-CM

## 2018-08-15 ENCOUNTER — HOSPITAL ENCOUNTER (OUTPATIENT)
Dept: GENERAL RADIOLOGY | Age: 68
Discharge: HOME | End: 2018-08-15

## 2018-08-15 DIAGNOSIS — M54.2 CERVICAL PAIN: ICD-10-CM

## 2018-08-15 DIAGNOSIS — M54.5 LOW BACK PAIN, UNSPECIFIED BACK PAIN LATERALITY, UNSPECIFIED CHRONICITY, WITH SCIATICA PRESENCE UNSPECIFIED: ICD-10-CM

## 2018-08-15 DIAGNOSIS — M54.2 CERVICALGIA: ICD-10-CM

## 2018-08-31 ENCOUNTER — HOSPITAL ENCOUNTER (OUTPATIENT)
Dept: GENERAL RADIOLOGY | Age: 68
Discharge: HOME | End: 2018-08-31
Attending: PSYCHIATRY & NEUROLOGY | Admitting: PSYCHIATRY & NEUROLOGY

## 2018-08-31 DIAGNOSIS — G95.9 DISEASE OF SPINAL CORD (HCC): ICD-10-CM

## 2018-10-20 RX ORDER — LOSARTAN POTASSIUM 25 MG/1
25 TABLET ORAL 2 TIMES DAILY
Qty: 180 TABLET | Refills: 2 | Status: SHIPPED | OUTPATIENT
Start: 2018-10-20 | End: 2019-03-20 | Stop reason: SINTOL

## 2018-11-05 DIAGNOSIS — F41.1 GAD (GENERALIZED ANXIETY DISORDER): ICD-10-CM

## 2018-11-05 RX ORDER — BUSPIRONE HYDROCHLORIDE 10 MG/1
10 TABLET ORAL 3 TIMES DAILY
Qty: 90 TABLET | Refills: 0 | Status: SHIPPED | OUTPATIENT
Start: 2018-11-05 | End: 2018-11-20

## 2018-11-05 RX ORDER — SERTRALINE HYDROCHLORIDE 100 MG/1
100 TABLET, FILM COATED ORAL DAILY
Qty: 90 TABLET | Refills: 0 | Status: SHIPPED | OUTPATIENT
Start: 2018-11-05 | End: 2019-05-22 | Stop reason: SDUPTHER

## 2018-12-17 ENCOUNTER — OFFICE VISIT (OUTPATIENT)
Dept: CARDIOLOGY CLINIC | Age: 68
End: 2018-12-17
Payer: COMMERCIAL

## 2018-12-17 VITALS
BODY MASS INDEX: 24.33 KG/M2 | DIASTOLIC BLOOD PRESSURE: 84 MMHG | HEART RATE: 84 BPM | HEIGHT: 67 IN | SYSTOLIC BLOOD PRESSURE: 130 MMHG | WEIGHT: 155 LBS

## 2018-12-17 DIAGNOSIS — Z98.61 S/P PTCA (PERCUTANEOUS TRANSLUMINAL CORONARY ANGIOPLASTY): ICD-10-CM

## 2018-12-17 DIAGNOSIS — I25.10 CORONARY ARTERY DISEASE INVOLVING NATIVE CORONARY ARTERY OF NATIVE HEART WITHOUT ANGINA PECTORIS: Primary | ICD-10-CM

## 2018-12-17 PROCEDURE — 99213 OFFICE O/P EST LOW 20 MIN: CPT | Performed by: INTERNAL MEDICINE

## 2018-12-17 RX ORDER — VARENICLINE TARTRATE 25 MG
KIT ORAL
Qty: 1 EACH | Refills: 0 | Status: SHIPPED | OUTPATIENT
Start: 2018-12-17 | End: 2019-05-22

## 2018-12-17 RX ORDER — VARENICLINE TARTRATE 1 MG/1
1 TABLET, FILM COATED ORAL 2 TIMES DAILY
Qty: 60 TABLET | Refills: 3 | Status: SHIPPED | OUTPATIENT
Start: 2018-12-17 | End: 2019-05-22

## 2019-03-12 RX ORDER — PRASUGREL 10 MG/1
10 TABLET, FILM COATED ORAL DAILY
Qty: 90 TABLET | Refills: 2 | Status: SHIPPED | OUTPATIENT
Start: 2019-03-12 | End: 2019-10-08 | Stop reason: SDUPTHER

## 2019-03-20 RX ORDER — ENALAPRIL MALEATE 5 MG/1
5 TABLET ORAL DAILY
Qty: 90 TABLET | Refills: 3 | Status: SHIPPED | OUTPATIENT
Start: 2019-03-20 | End: 2020-02-04

## 2019-05-22 ENCOUNTER — OFFICE VISIT (OUTPATIENT)
Dept: FAMILY MEDICINE CLINIC | Age: 69
End: 2019-05-22
Payer: COMMERCIAL

## 2019-05-22 VITALS
DIASTOLIC BLOOD PRESSURE: 84 MMHG | BODY MASS INDEX: 24.18 KG/M2 | HEART RATE: 85 BPM | OXYGEN SATURATION: 98 % | SYSTOLIC BLOOD PRESSURE: 132 MMHG | WEIGHT: 154.4 LBS

## 2019-05-22 DIAGNOSIS — E78.2 MIXED HYPERLIPIDEMIA: ICD-10-CM

## 2019-05-22 DIAGNOSIS — M79.606 PAIN OF LOWER EXTREMITY, UNSPECIFIED LATERALITY: ICD-10-CM

## 2019-05-22 DIAGNOSIS — Z13.29 THYROID DISORDER SCREEN: ICD-10-CM

## 2019-05-22 DIAGNOSIS — F41.1 GAD (GENERALIZED ANXIETY DISORDER): ICD-10-CM

## 2019-05-22 DIAGNOSIS — I10 ESSENTIAL HYPERTENSION: Primary | ICD-10-CM

## 2019-05-22 DIAGNOSIS — Z13.1 DIABETES MELLITUS SCREENING: ICD-10-CM

## 2019-05-22 DIAGNOSIS — Z12.31 ENCOUNTER FOR SCREENING MAMMOGRAM FOR BREAST CANCER: ICD-10-CM

## 2019-05-22 DIAGNOSIS — I25.10 CORONARY ARTERY DISEASE INVOLVING NATIVE CORONARY ARTERY OF NATIVE HEART WITHOUT ANGINA PECTORIS: ICD-10-CM

## 2019-05-22 PROCEDURE — 90471 IMMUNIZATION ADMIN: CPT | Performed by: FAMILY MEDICINE

## 2019-05-22 PROCEDURE — 99214 OFFICE O/P EST MOD 30 MIN: CPT | Performed by: FAMILY MEDICINE

## 2019-05-22 PROCEDURE — 90670 PCV13 VACCINE IM: CPT | Performed by: FAMILY MEDICINE

## 2019-05-22 RX ORDER — SERTRALINE HYDROCHLORIDE 100 MG/1
100 TABLET, FILM COATED ORAL DAILY
Qty: 90 TABLET | Refills: 5 | Status: SHIPPED | OUTPATIENT
Start: 2019-05-22 | End: 2020-05-28 | Stop reason: SDUPTHER

## 2019-05-22 RX ORDER — CLONAZEPAM 0.5 MG/1
0.5 TABLET ORAL DAILY PRN
COMMUNITY
Start: 2019-05-10

## 2019-05-22 ASSESSMENT — PATIENT HEALTH QUESTIONNAIRE - PHQ9
SUM OF ALL RESPONSES TO PHQ9 QUESTIONS 1 & 2: 2
SUM OF ALL RESPONSES TO PHQ QUESTIONS 1-9: 2
SUM OF ALL RESPONSES TO PHQ QUESTIONS 1-9: 2
2. FEELING DOWN, DEPRESSED OR HOPELESS: 0
1. LITTLE INTEREST OR PLEASURE IN DOING THINGS: 2

## 2019-05-22 NOTE — PROGRESS NOTES
Michelle Ferro  1950 06/04/19    Chief Complaint   Patient presents with    New Patient           79 yrs old lady with PMH of HTN, HLD, CAD, STANLEY, came today to establish with us. The patient is taking hypertensive medications compliantly without side effects. Denies chest pain, dyspnea, edema, or TIA's. Need medication refill. Also c/o leg pain, going for few wks, no swelling, no numbness. Past Medical History:   Diagnosis Date    CAD (coronary artery disease)     CHF (congestive heart failure) ([address]     H/O bilateral oophorectomy ~2006    H/O cardiovascular stress test 04/21/2014    EF 70%, no ischemia, normal LV systolic function, normal perfusion pattern.  H/O cardiovascular stress test 4/16/2015    treadmill    H/O Doppler ultrasound 03/03/2017    carotid - bilateral disease 0-49%    H/O echocardiogram 04/02/2019    See media    H/O exercise stress test 03/22/2018    treadmill    Headache     History of cardiac cath 03/08/2018    LAD PCI, RCA PCI difficult needed guideliner. Nml EF, CX 50% 2 locations    Hx of cardiovascular stress test 11/2011    EF70%    Hx of cardiovascular stress test 11/21/2017    EF 75% ECG portion of stress is possitive for ischemia by diagnositic criteria.     Hx of Doppler ultrasound 10/2012    peripheral duplex normal    Hx of echocardiogram 10/14/2011    EF>55%    Hyperlipidemia     Hypertension     Pulmonary emboli ([address] ~1997 - 98    post op    S/P PTCA (percutaneous transluminal coronary angioplasty) 10/26/2011    ptca with stent to LAD     Past Surgical History:   Procedure Laterality Date    BREAST LUMPECTOMY      COLONOSCOPY  08/29/2016    1 colon polyp    CORONARY ANGIOPLASTY  10826/2011    ptca with stent to LAD    DIAGNOSTIC CARDIAC CATH LAB PROCEDURE  2001    POLYPECTOMY      ROTATOR CUFF REPAIR Left     SMALL INTESTINE SURGERY      JENNY AND BSO       Family History   Problem Relation Age of Onset    Liver Disease Mother     Heart Disease Mother     Liver Cancer Sister         Alcoholic    Cancer Sister         Breast    Stroke Sister      Social History     Socioeconomic History    Marital status:      Spouse name: Not on file    Number of children: Not on file    Years of education: Not on file    Highest education level: Not on file   Occupational History    Not on file   Social Needs    Financial resource strain: Not on file    Food insecurity:     Worry: Not on file     Inability: Not on file    Transportation needs:     Medical: Not on file     Non-medical: Not on file   Tobacco Use    Smoking status: Current Every Day Smoker     Packs/day: 0.10     Years: 43.00     Pack years: 4.30     Types: Cigarettes    Smokeless tobacco: Never Used   Substance and Sexual Activity    Alcohol use:  Yes     Alcohol/week: 7.2 oz     Types: 12 Cans of beer per week     Comment: occassioanlly     Drug use: No    Sexual activity: Not Currently     Partners: Male   Lifestyle    Physical activity:     Days per week: Not on file     Minutes per session: Not on file    Stress: Not on file   Relationships    Social connections:     Talks on phone: Not on file     Gets together: Not on file     Attends Zoroastrianism service: Not on file     Active member of club or organization: Not on file     Attends meetings of clubs or organizations: Not on file     Relationship status: Not on file    Intimate partner violence:     Fear of current or ex partner: Not on file     Emotionally abused: Not on file     Physically abused: Not on file     Forced sexual activity: Not on file   Other Topics Concern    Not on file   Social History Narrative    Not on file       Allergies   Allergen Reactions    Codeine Itching    Elavil [Amitriptyline] Other (See Comments)     Made her Crazy and caused sleep walking    Gabapentin Swelling    Morphine      restlessness    Other      pheldine    Percocet [Oxycodone-Acetaminophen]     Trazodone And Nefazodone Swelling     Current Outpatient Medications   Medication Sig Dispense Refill    clonazePAM (KLONOPIN) 0.5 MG tablet Take 0.5 mg by mouth daily as needed.  sertraline (ZOLOFT) 100 MG tablet Take 1 tablet by mouth daily 90 tablet 5    enalapril (VASOTEC) 5 MG tablet Take 1 tablet by mouth daily 90 tablet 3    prasugrel (EFFIENT) 10 MG TABS Take 1 tablet by mouth daily 90 tablet 2    HYDROcodone-acetaminophen (NORCO) 5-325 MG per tablet       umeclidinium-vilanterol (ANORO ELLIPTA) 62.5-25 MCG/INH AEPB inhaler Inhale 1 puff into the lungs daily 3 each 3    rOPINIRole (REQUIP) 0.5 MG tablet Take 1 tablet by mouth 2 times daily 180 tablet 3    guaiFENesin (MUCINEX) 600 MG extended release tablet Take 1 tablet by mouth 2 times daily 60 tablet 5    Respiratory Therapy Supplies (NEBULIZER AIR TUBE/PLUGS) MISC 1 Tube by Does not apply route 4 times daily 1 each 0    albuterol (PROVENTIL) (2.5 MG/3ML) 0.083% nebulizer solution Take 3 mLs by nebulization 4 times daily 120 each 3    nitroGLYCERIN (NITROLINGUAL) 0.4 MG/SPRAY 0.4 mg spray Place 1 spray under the tongue every 5 minutes as needed for Chest pain 1 Bottle 3    aspirin 81 MG EC tablet Take 81 mg by mouth daily. No current facility-administered medications for this visit. Review of Systems   Constitutional: Negative for activity change, appetite change, chills, fatigue and fever. HENT: Negative for congestion, postnasal drip, sinus pressure and sore throat. Respiratory: Negative for cough, chest tightness, shortness of breath and wheezing. Cardiovascular: Negative for chest pain and leg swelling. Gastrointestinal: Negative for abdominal pain, constipation and diarrhea. Genitourinary: Negative for dysuria and frequency. Musculoskeletal: Negative for back pain and gait problem. Skin: Negative for rash. Neurological: Negative for dizziness, weakness and headaches.    Psychiatric/Behavioral: Negative for agitation and behavioral problems. The patient is not nervous/anxious. Lab Results   Component Value Date    WBC 8.6 03/09/2018    HGB 12.0 (L) 03/09/2018    HCT 35.5 (L) 03/09/2018    .0 (H) 03/09/2018     03/09/2018     Lab Results   Component Value Date     03/28/2018    K 4.3 03/28/2018    CL 97 (L) 03/28/2018    CO2 27 03/28/2018    BUN 8 03/28/2018    CREATININE 0.9 03/28/2018    GLUCOSE 85 03/28/2018    CALCIUM 9.4 03/28/2018    PROT 7.3 03/28/2018    LABALBU 4.4 03/28/2018    BILITOT 0.2 03/28/2018    ALKPHOS 55 03/28/2018    AST 20 03/28/2018    ALT 11 03/28/2018    LABGLOM >60 03/28/2018    GFRAA >60 03/28/2018     Lab Results   Component Value Date    CHOL 223 (H) 03/03/2017    CHOL 220 (H) 07/22/2016    CHOL 230 (H) 02/26/2013     Lab Results   Component Value Date    TRIG 118 03/03/2017    TRIG 114 07/22/2016    TRIG 110 02/26/2013     Lab Results   Component Value Date    HDL 67 03/03/2017    HDL 68 07/22/2016    HDL 66 02/26/2013     No results found for: LDLCALC, LDLCHOLESTEROL  Lab Results   Component Value Date    LABA1C 6.0 03/11/2016     Lab Results   Component Value Date    TSHHS 2.470 03/28/2018         /84 (Site: Left Upper Arm, Position: Sitting, Cuff Size: Medium Adult)   Pulse 85   Wt 154 lb 6.4 oz (70 kg)   SpO2 98%   BMI 24.18 kg/m²     BP Readings from Last 3 Encounters:   05/22/19 132/84   12/17/18 130/84   07/02/18 134/84       Wt Readings from Last 3 Encounters:   05/22/19 154 lb 6.4 oz (70 kg)   12/17/18 155 lb (70.3 kg)   11/20/18 148 lb (67.1 kg)         Physical Exam   Constitutional: She is oriented to person, place, and time. She appears well-developed and well-nourished. No distress. HENT:   Head: Normocephalic and atraumatic. Right Ear: External ear normal.   Left Ear: External ear normal.   Nose: Nose normal.   Mouth/Throat: Oropharynx is clear and moist. No oropharyngeal exudate. Eyes: Pupils are equal, round, and reactive to light.  EOM are normal. No scleral icterus. Neck: Normal range of motion. Neck supple. No thyromegaly present. Cardiovascular: Normal rate, regular rhythm and normal heart sounds. No murmur heard. Pulmonary/Chest: Effort normal and breath sounds normal. She has no wheezes. She has no rales. Abdominal: Soft. She exhibits no distension and no mass. There is no tenderness. Musculoskeletal: Normal range of motion. She exhibits no edema. Lymphadenopathy:     She has no cervical adenopathy. Neurological: She is alert and oriented to person, place, and time. No cranial nerve deficit. She exhibits normal muscle tone. Coordination normal.   Skin: She is not diaphoretic. Psychiatric: She has a normal mood and affect. Her behavior is normal.       ASSESSMENT/ PLAN:    1. Essential hypertension  - stable  - Lipid Panel; Future  - Comprehensive Metabolic Panel; Future  - CBC Auto Differential; Future    2. Mixed hyperlipidemia  - stable    3. Coronary artery disease involving native coronary artery of native heart without angina pectoris  - stable    4. STANLEY (generalized anxiety disorder)  - stable  - sertraline (ZOLOFT) 100 MG tablet; Take 1 tablet by mouth daily  Dispense: 90 tablet; Refill: 5    5. Thyroid disorder screen  - T4, Free; Future  - TSH without Reflex; Future    6. Diabetes mellitus screening  - Hemoglobin A1C; Future    7. Encounter for screening mammogram for breast cancer  - [facility] Digital Screen Bilateral [[id number]]; Future    8. Pain of lower extremity, unspecified laterality  - Vitamin B12 & Folate; Future                - Appropriateprescription are addressed. - After visit summery provided. - Questions answered and patient verbalizes understanding.  - Call for any problem, questions, or concerns. Return in about 1 month (around 6/22/2019).

## 2019-06-04 ASSESSMENT — ENCOUNTER SYMPTOMS
WHEEZING: 0
SORE THROAT: 0
ABDOMINAL PAIN: 0
DIARRHEA: 0
SHORTNESS OF BREATH: 0
SINUS PRESSURE: 0
BACK PAIN: 0
COUGH: 0
CONSTIPATION: 0
CHEST TIGHTNESS: 0

## 2019-07-15 ENCOUNTER — HOSPITAL ENCOUNTER (OUTPATIENT)
Age: 69
Discharge: HOME | End: 2019-07-15
Payer: COMMERCIAL

## 2019-07-15 LAB
BASOPHILS ABSOLUTE: 0.1 K/CU MM
BASOPHILS RELATIVE PERCENT: 0.6 % (ref 0–1)
CHOLESTEROL: 213 MG/DL
DIFFERENTIAL TYPE: ABNORMAL
EOSINOPHILS ABSOLUTE: 0.1 K/CU MM
EOSINOPHILS RELATIVE PERCENT: 1.7 % (ref 0–3)
ESTIMATED AVERAGE GLUCOSE: 108 MG/DL
FOLATE: 15.9 NG/ML (ref 3.1–17.5)
HBA1C MFR BLD: 5.4 % (ref 4.2–6.3)
HCT VFR BLD CALC: 42.3 % (ref 37–47)
HDLC SERPL-MCNC: 72 MG/DL
HEMOGLOBIN: 14 GM/DL (ref 12.5–16)
IMMATURE NEUTROPHIL %: 0.4 % (ref 0–0.43)
LDL CHOLESTEROL DIRECT: 145 MG/DL
LYMPHOCYTES ABSOLUTE: 2.1 K/CU MM
LYMPHOCYTES RELATIVE PERCENT: 25.5 % (ref 24–44)
MCH RBC QN AUTO: 35.4 PG (ref 27–31)
MCHC RBC AUTO-ENTMCNC: 33.1 % (ref 32–36)
MCV RBC AUTO: 106.8 FL (ref 78–100)
MONOCYTES ABSOLUTE: 0.5 K/CU MM
MONOCYTES RELATIVE PERCENT: 6.1 % (ref 0–4)
NUCLEATED RBC %: 0 %
PDW BLD-RTO: 12.3 % (ref 11.7–14.9)
PLATELET # BLD: 279 K/CU MM (ref 140–440)
PMV BLD AUTO: 9.1 FL (ref 7.5–11.1)
RBC # BLD: 3.96 M/CU MM (ref 4.2–5.4)
SEGMENTED NEUTROPHILS ABSOLUTE COUNT: 5.5 K/CU MM
SEGMENTED NEUTROPHILS RELATIVE PERCENT: 65.7 % (ref 36–66)
T4 FREE: 0.95 NG/DL (ref 0.9–1.8)
TOTAL IMMATURE NEUTOROPHIL: 0.03 K/CU MM
TOTAL NUCLEATED RBC: 0 K/CU MM
TRIGL SERPL-MCNC: 130 MG/DL
TSH HIGH SENSITIVITY: 1.86 UIU/ML (ref 0.27–4.2)
VITAMIN B-12: 451.2 PG/ML (ref 211–911)
WBC # BLD: 8.4 K/CU MM (ref 4–10.5)

## 2019-07-15 PROCEDURE — 80061 LIPID PANEL: CPT

## 2019-07-15 PROCEDURE — 82607 VITAMIN B-12: CPT

## 2019-07-15 PROCEDURE — 83721 ASSAY OF BLOOD LIPOPROTEIN: CPT

## 2019-07-15 PROCEDURE — 36415 COLL VENOUS BLD VENIPUNCTURE: CPT

## 2019-07-15 PROCEDURE — 83036 HEMOGLOBIN GLYCOSYLATED A1C: CPT

## 2019-07-15 PROCEDURE — 82746 ASSAY OF FOLIC ACID SERUM: CPT

## 2019-07-15 PROCEDURE — 84439 ASSAY OF FREE THYROXINE: CPT

## 2019-07-15 PROCEDURE — 85025 COMPLETE CBC W/AUTO DIFF WBC: CPT

## 2019-07-15 PROCEDURE — 84443 ASSAY THYROID STIM HORMONE: CPT

## 2019-07-31 ENCOUNTER — OFFICE VISIT (OUTPATIENT)
Dept: FAMILY MEDICINE CLINIC | Age: 69
End: 2019-07-31
Payer: COMMERCIAL

## 2019-07-31 VITALS
WEIGHT: 150.2 LBS | RESPIRATION RATE: 14 BRPM | BODY MASS INDEX: 23.57 KG/M2 | DIASTOLIC BLOOD PRESSURE: 80 MMHG | HEART RATE: 88 BPM | SYSTOLIC BLOOD PRESSURE: 128 MMHG | OXYGEN SATURATION: 98 % | HEIGHT: 67 IN

## 2019-07-31 DIAGNOSIS — I25.10 CORONARY ARTERY DISEASE INVOLVING NATIVE CORONARY ARTERY OF NATIVE HEART WITHOUT ANGINA PECTORIS: ICD-10-CM

## 2019-07-31 DIAGNOSIS — I10 ESSENTIAL HYPERTENSION: Primary | ICD-10-CM

## 2019-07-31 DIAGNOSIS — E78.2 MIXED HYPERLIPIDEMIA: ICD-10-CM

## 2019-07-31 DIAGNOSIS — Z13.1 DIABETES MELLITUS SCREENING: ICD-10-CM

## 2019-07-31 DIAGNOSIS — Z13.29 THYROID DISORDER SCREEN: ICD-10-CM

## 2019-07-31 PROCEDURE — 99213 OFFICE O/P EST LOW 20 MIN: CPT | Performed by: FAMILY MEDICINE

## 2019-07-31 ASSESSMENT — ENCOUNTER SYMPTOMS
BACK PAIN: 0
COUGH: 0
SHORTNESS OF BREATH: 0
WHEEZING: 0
CHEST TIGHTNESS: 0

## 2019-07-31 NOTE — PROGRESS NOTES
Cheryle Natali  1950 07/31/19    Chief Complaint   Patient presents with    1 Month Follow-Up     discuss the lab results           Patient here for 1 month f/u to discuss the lab results, patient doing fine, no complaints at this time, except she has chest pain few wks ago, and refuse to go to the ED, patient has h/o CAD and f/u with the cardiology, was referred last visit. Now more chest pain or SOB. Past Medical History:   Diagnosis Date    CAD (coronary artery disease)     CHF (congestive heart failure) ([address]     H/O bilateral oophorectomy ~2006    H/O cardiovascular stress test 04/21/2014    EF 70%, no ischemia, normal LV systolic function, normal perfusion pattern.  H/O cardiovascular stress test 4/16/2015    treadmill    H/O Doppler ultrasound 03/03/2017    carotid - bilateral disease 0-49%    H/O echocardiogram 04/02/2019    See media    H/O exercise stress test 03/22/2018    treadmill    Headache     History of cardiac cath 03/08/2018    LAD PCI, RCA PCI difficult needed guideliner. Nml EF, CX 50% 2 locations    Hx of cardiovascular stress test 11/2011    EF70%    Hx of cardiovascular stress test 11/21/2017    EF 75% ECG portion of stress is possitive for ischemia by diagnositic criteria.     Hx of Doppler ultrasound 10/2012    peripheral duplex normal    Hx of echocardiogram 10/14/2011    EF>55%    Hyperlipidemia     Hypertension     Pulmonary emboli ([address] ~1997 - 98    post op    S/P PTCA (percutaneous transluminal coronary angioplasty) 10/26/2011    ptca with stent to LAD     Past Surgical History:   Procedure Laterality Date    BREAST LUMPECTOMY      COLONOSCOPY  08/29/2016    1 colon polyp    CORONARY ANGIOPLASTY  10826/2011    ptca with stent to LAD    DIAGNOSTIC CARDIAC CATH LAB PROCEDURE  2001    POLYPECTOMY      ROTATOR CUFF REPAIR Left     SMALL INTESTINE SURGERY      JENNY AND BSO       Family History   Problem Relation Age of Onset    Liver Disease Mother  Trazodone And Nefazodone Swelling     Current Outpatient Medications   Medication Sig Dispense Refill    clonazePAM (KLONOPIN) 0.5 MG tablet Take 0.5 mg by mouth daily as needed.  sertraline (ZOLOFT) 100 MG tablet Take 1 tablet by mouth daily 90 tablet 5    enalapril (VASOTEC) 5 MG tablet Take 1 tablet by mouth daily 90 tablet 3    prasugrel (EFFIENT) 10 MG TABS Take 1 tablet by mouth daily 90 tablet 2    HYDROcodone-acetaminophen (NORCO) 5-325 MG per tablet       umeclidinium-vilanterol (ANORO ELLIPTA) 62.5-25 MCG/INH AEPB inhaler Inhale 1 puff into the lungs daily 3 each 3    rOPINIRole (REQUIP) 0.5 MG tablet Take 1 tablet by mouth 2 times daily 180 tablet 3    guaiFENesin (MUCINEX) 600 MG extended release tablet Take 1 tablet by mouth 2 times daily 60 tablet 5    Respiratory Therapy Supplies (NEBULIZER AIR TUBE/PLUGS) MISC 1 Tube by Does not apply route 4 times daily 1 each 0    albuterol (PROVENTIL) (2.5 MG/3ML) 0.083% nebulizer solution Take 3 mLs by nebulization 4 times daily 120 each 3    nitroGLYCERIN (NITROLINGUAL) 0.4 MG/SPRAY 0.4 mg spray Place 1 spray under the tongue every 5 minutes as needed for Chest pain 1 Bottle 3    aspirin 81 MG EC tablet Take 81 mg by mouth daily. No current facility-administered medications for this visit. Review of Systems   Constitutional: Negative for activity change, appetite change, chills, fatigue and fever. Respiratory: Negative for cough, chest tightness, shortness of breath and wheezing. Cardiovascular: Positive for chest pain (improved). Negative for leg swelling. Genitourinary: Negative for dysuria and frequency. Musculoskeletal: Negative for back pain and gait problem. Skin: Negative for rash. Neurological: Negative for dizziness, weakness and headaches. Psychiatric/Behavioral: Negative for agitation and behavioral problems. The patient is not nervous/anxious.         Lab Results   Component Value Date    WBC 8.4

## 2019-08-15 ENCOUNTER — OFFICE VISIT (OUTPATIENT)
Dept: CARDIOLOGY CLINIC | Age: 69
End: 2019-08-15
Payer: COMMERCIAL

## 2019-08-15 VITALS
HEART RATE: 73 BPM | DIASTOLIC BLOOD PRESSURE: 82 MMHG | WEIGHT: 151 LBS | HEIGHT: 66 IN | BODY MASS INDEX: 24.27 KG/M2 | SYSTOLIC BLOOD PRESSURE: 126 MMHG

## 2019-08-15 DIAGNOSIS — R07.9 CHEST PAIN, UNSPECIFIED TYPE: Primary | ICD-10-CM

## 2019-08-15 DIAGNOSIS — I10 ESSENTIAL HYPERTENSION: ICD-10-CM

## 2019-08-15 DIAGNOSIS — I25.10 CORONARY ARTERY DISEASE INVOLVING NATIVE CORONARY ARTERY OF NATIVE HEART WITHOUT ANGINA PECTORIS: ICD-10-CM

## 2019-08-15 PROCEDURE — 93000 ELECTROCARDIOGRAM COMPLETE: CPT | Performed by: NURSE PRACTITIONER

## 2019-08-15 PROCEDURE — 99213 OFFICE O/P EST LOW 20 MIN: CPT | Performed by: NURSE PRACTITIONER

## 2019-08-15 NOTE — PROGRESS NOTES
dyspnea on exertion, edema or palpitations  Gastrointestinal: no abdominal pain, change in bowel habits, or black or bloody stools  Genito-Urinary: no dysuria, trouble voiding, or hematuria  Musculoskeletal: negative for - gait disturbance, pain, swelling. Neurological: negative for - confusion, dizziness, impaired coordination/balance or numbness/tingling    Objective:      Physical Exam:  /82   Pulse 73   Ht 5' 6\" (1.676 m)   Wt 151 lb (68.5 kg)   BMI 24.37 kg/m²   Wt Readings from Last 3 Encounters:   08/15/19 151 lb (68.5 kg)   07/31/19 150 lb 3.2 oz (68.1 kg)   05/22/19 154 lb 6.4 oz (70 kg)     Body mass index is 24.37 kg/m². GENERAL - Alert, oriented, pleasant, in no apparent distress. Head unremarkable  Eyes - pupils equal and reactive to light - bilateral conjunctiva are pink: sclera are white   ENT - external ears intact, nose is intact:  tongue is midline pink and moist  Neck - Supple. No jugular venous distention noted. No carotid bruits appreciated. Cardiovascular - Normal S1 and S2:  murmur appreciated No, No gallop. Regular rate- Yes,  rhythm regular-Yes. Extremities - No cyanosis, clubbing, no edema to lower legs. Pulmonary - No respiratory distress. No wheezes or rales. Chest is clear  Pulses: Bilateral radial and pedal pulses normal  Abdomen -  Soft no tenderness, non distended   Musculoskeletal - Normal movement of all extremities   Neurologic - alert and oriented: There are no gross focal neurologic abnormalities.    Skin-  No rash: No echymosis   Affect- normal mood and pleasant       DATA:  Lab Results   Component Value Date    CKTOTAL 119 03/03/2017     BNP:  No results found for: BNP  PT/INR:  No results found for: PTINR  Lab Results   Component Value Date    LABA1C 5.4 07/15/2019    LABA1C 6.0 03/11/2016     Lab Results   Component Value Date    CHOL 213 (H) 07/15/2019    TRIG 130 07/15/2019    HDL 72 07/15/2019    LDLDIRECT 145 (H) 07/15/2019     Lab Results

## 2019-08-19 NOTE — ASSESSMENT & PLAN NOTE
 Patient is  having cardiac symptoms   continue BB, antiplatelet, ASA   Low salt, Low cholesterol diet   Last stress test 3/2018 treadmill stress Summary   The patient exercised according to the HEIDI for 4:00 min:s, achieving a   work level of Max. METS: 4.60.   The resting heart rate of 93 bpm kim to a maximal heart rate of 116 bpm.   This value represents 74 % of the   maximal, age-predicted heart rate. The resting blood pressure of 132/70 mmHg   , kim to a maximum blood   pressure of 168/80 mmHg. The exercise test was stopped due to Fatigue.      Reduced exercise performance without angina and ischemic EKG changes.    Cardiolite stress test ordered

## 2019-08-19 NOTE — ASSESSMENT & PLAN NOTE
 Controlled   To continue Beta blocker, Calcium channel blocker, ACE, ARB, Vasodilator, Diuretic   advised low salt diet   Last echo 2014 showed 1. Normal dimension of the left ventricle, with normal global and regional  left ventricular systolic function, with an ejection fraction of about 60%. 2.  No significant valvulopathy seen. 3.  No pulmonary hypertension is seen.   ·   Echo ordered

## 2019-08-27 ENCOUNTER — TELEPHONE (OUTPATIENT)
Dept: CARDIOLOGY CLINIC | Age: 69
End: 2019-08-27

## 2019-09-06 ENCOUNTER — PROCEDURE VISIT (OUTPATIENT)
Dept: CARDIOLOGY CLINIC | Age: 69
End: 2019-09-06
Payer: COMMERCIAL

## 2019-09-06 DIAGNOSIS — I25.10 CORONARY ARTERY DISEASE INVOLVING NATIVE CORONARY ARTERY OF NATIVE HEART WITHOUT ANGINA PECTORIS: ICD-10-CM

## 2019-09-06 DIAGNOSIS — R07.9 CHEST PAIN, UNSPECIFIED TYPE: ICD-10-CM

## 2019-09-06 DIAGNOSIS — I10 ESSENTIAL HYPERTENSION: ICD-10-CM

## 2019-09-06 DIAGNOSIS — R06.02 SHORTNESS OF BREATH: ICD-10-CM

## 2019-09-06 LAB
LV EF: 58 %
LV EF: 60 %
LVEF MODALITY: NORMAL
LVEF MODALITY: NORMAL

## 2019-09-06 PROCEDURE — 93017 CV STRESS TEST TRACING ONLY: CPT | Performed by: INTERNAL MEDICINE

## 2019-09-06 PROCEDURE — 78452 HT MUSCLE IMAGE SPECT MULT: CPT | Performed by: INTERNAL MEDICINE

## 2019-09-06 PROCEDURE — 93018 CV STRESS TEST I&R ONLY: CPT | Performed by: INTERNAL MEDICINE

## 2019-09-06 PROCEDURE — 93306 TTE W/DOPPLER COMPLETE: CPT | Performed by: INTERNAL MEDICINE

## 2019-09-06 PROCEDURE — A9500 TC99M SESTAMIBI: HCPCS | Performed by: INTERNAL MEDICINE

## 2019-09-06 PROCEDURE — 93016 CV STRESS TEST SUPVJ ONLY: CPT | Performed by: INTERNAL MEDICINE

## 2019-09-09 ENCOUNTER — TELEPHONE (OUTPATIENT)
Dept: CARDIOLOGY CLINIC | Age: 69
End: 2019-09-09

## 2019-09-17 ENCOUNTER — OFFICE VISIT (OUTPATIENT)
Dept: CARDIOLOGY CLINIC | Age: 69
End: 2019-09-17
Payer: COMMERCIAL

## 2019-09-17 VITALS
WEIGHT: 146.6 LBS | HEIGHT: 66 IN | BODY MASS INDEX: 23.56 KG/M2 | HEART RATE: 72 BPM | SYSTOLIC BLOOD PRESSURE: 132 MMHG | DIASTOLIC BLOOD PRESSURE: 84 MMHG

## 2019-09-17 DIAGNOSIS — Z98.61 S/P PTCA (PERCUTANEOUS TRANSLUMINAL CORONARY ANGIOPLASTY): Primary | ICD-10-CM

## 2019-09-17 PROCEDURE — 99213 OFFICE O/P EST LOW 20 MIN: CPT | Performed by: INTERNAL MEDICINE

## 2019-09-17 RX ORDER — TIZANIDINE 2 MG/1
2 TABLET ORAL EVERY 6 HOURS PRN
COMMUNITY

## 2019-10-08 RX ORDER — PRASUGREL 10 MG/1
10 TABLET, FILM COATED ORAL DAILY
Qty: 90 TABLET | Refills: 2 | Status: SHIPPED | OUTPATIENT
Start: 2019-10-08 | End: 2020-06-18 | Stop reason: SDUPTHER

## 2019-10-28 ENCOUNTER — HOSPITAL ENCOUNTER (OUTPATIENT)
Age: 69
Discharge: HOME | End: 2019-10-28
Payer: COMMERCIAL

## 2019-10-28 PROCEDURE — 83519 RIA NONANTIBODY: CPT

## 2019-10-28 PROCEDURE — 86255 FLUORESCENT ANTIBODY SCREEN: CPT

## 2019-10-28 PROCEDURE — 36415 COLL VENOUS BLD VENIPUNCTURE: CPT

## 2019-10-31 LAB
ACETYLCHOLINE BINDING ANTIBODY: 0.2 NMOL/L (ref 0–0.4)
ACETYLCHOLINE BINDING ANTIBODY: NORMAL NMOL/L (ref 0–0.4)
ACETYLCHOLINE BLOCKING AB: 20 % (ref 0–26)
ACETYLCHOLINE BLOCKING AB: NORMAL % (ref 0–26)
STRIATED MUSCLE AB, IGG SCREEN: NORMAL
STRIATED MUSCLE AB, IGG SCREEN: NORMAL
TITIN ANTIBODY: <0.09 IV (ref 0–0.45)
TITIN ANTIBODY: NORMAL IV (ref 0–0.45)

## 2020-02-04 RX ORDER — ENALAPRIL MALEATE 5 MG/1
5 TABLET ORAL DAILY
Qty: 90 TABLET | Refills: 3 | Status: SHIPPED | OUTPATIENT
Start: 2020-02-04 | End: 2020-06-18 | Stop reason: SDUPTHER

## 2020-02-10 ENCOUNTER — OFFICE VISIT (OUTPATIENT)
Dept: FAMILY MEDICINE CLINIC | Age: 70
End: 2020-02-10
Payer: COMMERCIAL

## 2020-02-10 VITALS
DIASTOLIC BLOOD PRESSURE: 80 MMHG | BODY MASS INDEX: 24.14 KG/M2 | RESPIRATION RATE: 12 BRPM | HEART RATE: 78 BPM | HEIGHT: 66 IN | OXYGEN SATURATION: 98 % | SYSTOLIC BLOOD PRESSURE: 130 MMHG | WEIGHT: 150.2 LBS

## 2020-02-10 PROCEDURE — 99215 OFFICE O/P EST HI 40 MIN: CPT | Performed by: FAMILY MEDICINE

## 2020-02-10 PROCEDURE — 90686 IIV4 VACC NO PRSV 0.5 ML IM: CPT | Performed by: FAMILY MEDICINE

## 2020-02-10 PROCEDURE — 90471 IMMUNIZATION ADMIN: CPT | Performed by: FAMILY MEDICINE

## 2020-02-10 ASSESSMENT — ENCOUNTER SYMPTOMS
ABDOMINAL PAIN: 0
COUGH: 0
CONSTIPATION: 0
DIARRHEA: 0
BACK PAIN: 1
WHEEZING: 0
SHORTNESS OF BREATH: 0

## 2020-02-10 NOTE — PROGRESS NOTES
abused: Not on file     Forced sexual activity: Not on file   Other Topics Concern    Not on file   Social History Narrative    Not on file       Allergies   Allergen Reactions    Codeine Itching    Elavil [Amitriptyline] Other (See Comments)     Made her Crazy and caused sleep walking    Gabapentin Swelling    Morphine      restlessness    Other      pheldine    Percocet [Oxycodone-Acetaminophen]     Trazodone And Nefazodone Swelling     Current Outpatient Medications   Medication Sig Dispense Refill    umeclidinium-vilanterol (ANORO ELLIPTA) 62.5-25 MCG/INH AEPB inhaler Inhale 1 puff into the lungs daily 3 each 3    UNABLE TO FIND Rx for cane 1 Units 0    enalapril (VASOTEC) 5 MG tablet Take 1 tablet by mouth daily 90 tablet 3    prasugrel (EFFIENT) 10 MG TABS Take 1 tablet by mouth daily 90 tablet 2    tiZANidine (ZANAFLEX) 2 MG tablet Take 2 mg by mouth every 6 hours as needed      clonazePAM (KLONOPIN) 0.5 MG tablet Take 0.5 mg by mouth daily as needed.  sertraline (ZOLOFT) 100 MG tablet Take 1 tablet by mouth daily 90 tablet 5    HYDROcodone-acetaminophen (NORCO) 5-325 MG per tablet       rOPINIRole (REQUIP) 0.5 MG tablet Take 1 tablet by mouth 2 times daily 180 tablet 3    guaiFENesin (MUCINEX) 600 MG extended release tablet Take 1 tablet by mouth 2 times daily 60 tablet 5    Respiratory Therapy Supplies (NEBULIZER AIR TUBE/PLUGS) MISC 1 Tube by Does not apply route 4 times daily 1 each 0    albuterol (PROVENTIL) (2.5 MG/3ML) 0.083% nebulizer solution Take 3 mLs by nebulization 4 times daily 120 each 3    nitroGLYCERIN (NITROLINGUAL) 0.4 MG/SPRAY 0.4 mg spray Place 1 spray under the tongue every 5 minutes as needed for Chest pain 1 Bottle 3    aspirin 81 MG EC tablet Take 81 mg by mouth daily. No current facility-administered medications for this visit. Review of Systems   Constitutional: Negative for activity change, appetite change, chills, fatigue and fever. oz (68.1 kg)   SpO2 98%   BMI 24.24 kg/m²     BP Readings from Last 3 Encounters:   02/10/20 130/80   09/17/19 132/84   08/15/19 126/82       Wt Readings from Last 3 Encounters:   02/10/20 150 lb 3.2 oz (68.1 kg)   09/17/19 146 lb 9.6 oz (66.5 kg)   08/15/19 151 lb (68.5 kg)         Physical Exam  Constitutional:       General: She is not in acute distress. Appearance: She is well-developed. She is not diaphoretic. HENT:      Head: Normocephalic and atraumatic. Eyes:      General: No scleral icterus. Pupils: Pupils are equal, round, and reactive to light. Neck:      Musculoskeletal: Normal range of motion and neck supple. Thyroid: No thyromegaly. Cardiovascular:      Rate and Rhythm: Normal rate and regular rhythm. Heart sounds: Normal heart sounds. No murmur. Pulmonary:      Effort: Pulmonary effort is normal.      Breath sounds: Normal breath sounds. No wheezing or rales. Musculoskeletal:      Right hip: She exhibits decreased range of motion and decreased strength. She exhibits no swelling and no crepitus. Left hip: She exhibits decreased range of motion and decreased strength. She exhibits no swelling and no crepitus. Right lower leg: No edema. Left lower leg: No edema. Lymphadenopathy:      Cervical: No cervical adenopathy. Skin:     Comments: There is 0.5x0.5 cm papule on the upper chest R side, with center crust, tender, no drainage. Neurological:      Mental Status: She is alert and oriented to person, place, and time. Motor: No abnormal muscle tone. Psychiatric:         Mood and Affect: Mood normal.         Behavior: Behavior normal.         ASSESSMENT/ PLAN:    1. Essential hypertension  - stable  - Comprehensive Metabolic Panel, Fasting; Future    2. Mixed hyperlipidemia  - stable    3. Chronic obstructive pulmonary disease, unspecified COPD type ([facility] [address]  - stable  - umeclidinium-vilanterol (ANORO ELLIPTA) 62.5-25 MCG/INH AEPB inhaler;  Inhale 1 puff into the lungs daily  Dispense: 3 each; Refill: 3    4. Coronary artery disease involving native coronary artery of native heart without angina pectoris   - stable    5. STANLEY (generalized anxiety disorder)  - stable    6. Skin lesion  - Amb External Referral To Dermatology    7. Bilateral hip pain  - XR HIP BILATERAL STANDARD W AP PELVIS; Future  - she is already on pain medication    8. Needs flu shot  - tolerate the shot  - INFLUENZA, QUADV, 3 YRS AND OLDER, IM PF, PREFILL SYR OR SDV, 0.5ML (AFLURIA QUADV, PF)            - all labs reviewed with the patient    - Appropriateprescription are addressed. - After visit summery provided. - Questions answered and patient verbalizes understanding.  - Call for any problem, questions, or concerns. Return in about 3 months (around 5/10/2020).

## 2020-02-11 ENCOUNTER — HOSPITAL ENCOUNTER (OUTPATIENT)
Age: 70
Discharge: HOME | End: 2020-02-11
Payer: COMMERCIAL

## 2020-02-11 ENCOUNTER — TELEPHONE (OUTPATIENT)
Dept: FAMILY MEDICINE CLINIC | Age: 70
End: 2020-02-11

## 2020-02-11 ENCOUNTER — HOSPITAL ENCOUNTER (OUTPATIENT)
Dept: GENERAL RADIOLOGY | Age: 70
Discharge: HOME | End: 2020-02-11
Payer: COMMERCIAL

## 2020-02-11 LAB
ALBUMIN SERPL-MCNC: 4.3 GM/DL (ref 3.4–5)
ALP BLD-CCNC: 55 IU/L (ref 40–128)
ALT SERPL-CCNC: 8 U/L (ref 10–40)
ANION GAP SERPL CALCULATED.3IONS-SCNC: 13 MMOL/L (ref 4–16)
AST SERPL-CCNC: 17 IU/L (ref 15–37)
BILIRUB SERPL-MCNC: 0.2 MG/DL (ref 0–1)
BUN BLDV-MCNC: 11 MG/DL (ref 6–23)
CALCIUM SERPL-MCNC: 9.3 MG/DL (ref 8.3–10.6)
CHLORIDE BLD-SCNC: 96 MMOL/L (ref 99–110)
CO2: 24 MMOL/L (ref 21–32)
CREAT SERPL-MCNC: 1 MG/DL (ref 0.6–1.1)
GFR AFRICAN AMERICAN: >60 ML/MIN/1.73M2
GFR NON-AFRICAN AMERICAN: 55 ML/MIN/1.73M2
GLUCOSE BLD-MCNC: 85 MG/DL (ref 70–99)
POTASSIUM SERPL-SCNC: 4.6 MMOL/L (ref 3.5–5.1)
SODIUM BLD-SCNC: 133 MMOL/L (ref 135–145)
TOTAL PROTEIN: 7.1 GM/DL (ref 6.4–8.2)

## 2020-02-11 PROCEDURE — 80053 COMPREHEN METABOLIC PANEL: CPT

## 2020-02-11 PROCEDURE — 73521 X-RAY EXAM HIPS BI 2 VIEWS: CPT

## 2020-02-11 PROCEDURE — 36415 COLL VENOUS BLD VENIPUNCTURE: CPT

## 2020-02-11 NOTE — TELEPHONE ENCOUNTER
Patient would like to know if we can change the script and send her 3 inhalers at a time because it is cheaper for the patient.  Please advise

## 2020-05-28 ENCOUNTER — OFFICE VISIT (OUTPATIENT)
Dept: FAMILY MEDICINE CLINIC | Age: 70
End: 2020-05-28
Payer: COMMERCIAL

## 2020-05-28 VITALS
DIASTOLIC BLOOD PRESSURE: 78 MMHG | HEIGHT: 66 IN | SYSTOLIC BLOOD PRESSURE: 130 MMHG | WEIGHT: 145 LBS | OXYGEN SATURATION: 95 % | BODY MASS INDEX: 23.3 KG/M2 | TEMPERATURE: 97.9 F | HEART RATE: 83 BPM

## 2020-05-28 PROCEDURE — 99214 OFFICE O/P EST MOD 30 MIN: CPT | Performed by: FAMILY MEDICINE

## 2020-05-28 PROCEDURE — 90732 PPSV23 VACC 2 YRS+ SUBQ/IM: CPT | Performed by: FAMILY MEDICINE

## 2020-05-28 PROCEDURE — 90471 IMMUNIZATION ADMIN: CPT | Performed by: FAMILY MEDICINE

## 2020-05-28 RX ORDER — SERTRALINE HYDROCHLORIDE 100 MG/1
100 TABLET, FILM COATED ORAL DAILY
Qty: 90 TABLET | Refills: 3 | Status: SHIPPED | OUTPATIENT
Start: 2020-05-28 | End: 2021-02-01 | Stop reason: SDUPTHER

## 2020-05-28 RX ORDER — ROPINIROLE 0.5 MG/1
0.5 TABLET, FILM COATED ORAL 2 TIMES DAILY
Qty: 180 TABLET | Refills: 3 | Status: SHIPPED | OUTPATIENT
Start: 2020-05-28

## 2020-05-28 ASSESSMENT — ENCOUNTER SYMPTOMS
ABDOMINAL PAIN: 0
SHORTNESS OF BREATH: 0
BACK PAIN: 1
WHEEZING: 0
CONSTIPATION: 0
COUGH: 0
DIARRHEA: 0

## 2020-05-28 ASSESSMENT — PATIENT HEALTH QUESTIONNAIRE - PHQ9
SUM OF ALL RESPONSES TO PHQ QUESTIONS 1-9: 0
2. FEELING DOWN, DEPRESSED OR HOPELESS: 0
SUM OF ALL RESPONSES TO PHQ9 QUESTIONS 1 & 2: 0
SUM OF ALL RESPONSES TO PHQ QUESTIONS 1-9: 0
1. LITTLE INTEREST OR PLEASURE IN DOING THINGS: 0

## 2020-06-18 ENCOUNTER — VIRTUAL VISIT (OUTPATIENT)
Dept: CARDIOLOGY CLINIC | Age: 70
End: 2020-06-18
Payer: COMMERCIAL

## 2020-06-18 PROCEDURE — 99443 PR PHYS/QHP TELEPHONE EVALUATION 21-30 MIN: CPT | Performed by: INTERNAL MEDICINE

## 2020-06-18 RX ORDER — LOSARTAN POTASSIUM 50 MG/1
50 TABLET ORAL DAILY
Qty: 30 TABLET | Refills: 3 | Status: SHIPPED | OUTPATIENT
Start: 2020-06-18 | End: 2020-10-01

## 2020-06-18 RX ORDER — PRASUGREL 10 MG/1
10 TABLET, FILM COATED ORAL DAILY
Qty: 90 TABLET | Refills: 2 | Status: SHIPPED | OUTPATIENT
Start: 2020-06-18 | End: 2021-01-11 | Stop reason: SDUPTHER

## 2020-06-18 RX ORDER — NITROGLYCERIN 400 UG/1
1 SPRAY ORAL EVERY 5 MIN PRN
Qty: 1 BOTTLE | Refills: 3 | Status: SHIPPED | OUTPATIENT
Start: 2020-06-18 | End: 2021-01-11

## 2020-06-18 RX ORDER — ENALAPRIL MALEATE 5 MG/1
5 TABLET ORAL DAILY
Qty: 90 TABLET | Refills: 3 | Status: SHIPPED | OUTPATIENT
Start: 2020-06-18 | End: 2020-06-18

## 2020-06-18 NOTE — PROGRESS NOTES
puff into the lungs daily 3 each 3    rOPINIRole (REQUIP) 0.5 MG tablet Take 1 tablet by mouth 2 times daily 180 tablet 3    UNABLE TO FIND Rx for cane 1 Units 0    tiZANidine (ZANAFLEX) 2 MG tablet Take 2 mg by mouth every 6 hours as needed      clonazePAM (KLONOPIN) 0.5 MG tablet Take 0.5 mg by mouth daily as needed.  HYDROcodone-acetaminophen (NORCO) 5-325 MG per tablet       guaiFENesin (MUCINEX) 600 MG extended release tablet Take 1 tablet by mouth 2 times daily 60 tablet 5    Respiratory Therapy Supplies (NEBULIZER AIR TUBE/PLUGS) MISC 1 Tube by Does not apply route 4 times daily 1 each 0    albuterol (PROVENTIL) (2.5 MG/3ML) 0.083% nebulizer solution Take 3 mLs by nebulization 4 times daily 120 each 3    aspirin 81 MG EC tablet Take 81 mg by mouth daily. No current facility-administered medications for this visit. Allergies: Codeine; Elavil [amitriptyline]; Gabapentin; Morphine; Other; Percocet [oxycodone-acetaminophen]; and Trazodone and nefazodone  Past Medical History:   Diagnosis Date    CAD (coronary artery disease)     Chest pain     CHF (congestive heart failure) ([address]     Current smoker     Family history of coronary artery disease     H/O Doppler ultrasound 03/03/2017    Carotid-Daniele. Disease=0-49%    History of echocardiogram 09/06/2019    EF 55-60%, Normal    History of nuclear stress test 09/06/2019    EF 60%, Normal    Hx of cardiovascular stress test 11/21/2017    EF=75%, EKG positive for ischemia.  Hyperlipidemia     Hypertension     Post PTCA 03/08/2018    LAD & RCA stented-difficult; CX 50%.  Pulmonary emboli ([address] 1997 or 1998    S/P PTCA (percutaneous transluminal coronary angioplasty) 10/26/2011    LAD stented.      Past Surgical History:   Procedure Laterality Date    BREAST LUMPECTOMY      COLONOSCOPY  08/29/2016    1 colon polyp    CORONARY ANGIOPLASTY  10826/2011    ptca with stent to LAD    DIAGNOSTIC CARDIAC CATH LAB PROCEDURE  2001    POLYPECTOMY      PTCA  2011    LAD stented.  PTCA  2018    LAD & RCA stented; Cx 50%.  ROTATOR CUFF REPAIR Left     SMALL INTESTINE SURGERY      JENNY AND BSO  2008      As reviewed   Family History   Problem Relation Age of Onset    Liver Disease Mother     Heart Disease Mother     Liver Cancer Sister         Alcoholic    Cancer Sister         Breast    Stroke Sister      Social History     Tobacco Use    Smoking status: Current Some Day Smoker     Packs/day: 0.10     Years: 43.00     Pack years: 4.30     Types: Cigarettes    Smokeless tobacco: Never Used    Tobacco comment: 5 cigarettes a day    Substance Use Topics    Alcohol use: Yes     Alcohol/week: 12.0 standard drinks     Types: 12 Cans of beer per week     Comment: occassioanlly  weekends      Review of Systems:    Constitutional: fatigue  Psychological:Negative for anxiety or depression  HENT: Negative for headaches, nasal congestion, sinus pain or vertigo  Eyes: Negative for visual disturbance. Endocrine: Negative for polydipsia/polyuria  Respiratory: Negative for shortness of breath  Cardiovascular: Negative for chest pain, dyspnea on exertion, claudication, edema, irregular heartbeat, murmur, palpitations or shortness of breath  Gastrointestinal: Negative for abdominal pain or heartburn  Genito-Urinary: Negative for urinary frequency/urgency  Musculoskeletal: Negative for muscle pain, muscular weakness, negative for pain in arm and leg or swelling in foot and leg  Neurological: Negative for dizziness, headaches, memory loss, numbness/tingling, visual changes, syncope  Dermatological: Negative for rash    Objective: There were no vitals taken for this visit. Wt Readings from Last 3 Encounters:   05/28/20 145 lb (65.8 kg)   02/10/20 150 lb 3.2 oz (68.1 kg)   09/17/19 146 lb 9.6 oz (66.5 kg)     There is no height or weight on file to calculate BMI. GENERAL - Alert, oriented, pleasant, in no apparent distress.       Lab Review   Lab has occurred in in prior 7 days.   Based on our conversation /evaluation , a subsequent office visit for the patient's problem is not indicated for  24 hrs      Time spent; 25 m  Location; [facility], [address], [address] [facility]  Office staff fifi VERAS were utilised                [facility]

## 2020-06-29 RX ORDER — VARENICLINE TARTRATE
KIT
Qty: 1 BOX | Refills: 0 | Status: SHIPPED | OUTPATIENT
Start: 2020-06-29

## 2020-08-17 RX ORDER — LOSARTAN POTASSIUM 25 MG/1
25 TABLET ORAL 2 TIMES DAILY
Qty: 180 TABLET | Refills: 3 | Status: SHIPPED | OUTPATIENT
Start: 2020-08-17 | End: 2021-01-01 | Stop reason: ALTCHOICE

## 2020-10-01 ENCOUNTER — OFFICE VISIT (OUTPATIENT)
Dept: FAMILY MEDICINE CLINIC | Age: 70
End: 2020-10-01
Payer: COMMERCIAL

## 2020-10-01 VITALS — WEIGHT: 136.6 LBS | BODY MASS INDEX: 21.95 KG/M2 | TEMPERATURE: 97 F | HEIGHT: 66 IN

## 2020-10-01 PROCEDURE — 90694 VACC AIIV4 NO PRSRV 0.5ML IM: CPT | Performed by: FAMILY MEDICINE

## 2020-10-01 PROCEDURE — 99213 OFFICE O/P EST LOW 20 MIN: CPT | Performed by: FAMILY MEDICINE

## 2020-10-01 PROCEDURE — 90471 IMMUNIZATION ADMIN: CPT | Performed by: FAMILY MEDICINE

## 2020-10-01 NOTE — PROGRESS NOTES
Vaccine Information Sheet, \"Influenza - Inactivated\"  given to Jolie Cabezas, or parent/legal guardian of  Jolie Cabezas and verbalized understanding. Patient responses:    Have you ever had a reaction to a flu vaccine? No  Do you have any current illness? No  Have you ever had Guillian Fremont Syndrome? No  Do you have a serious allergy to any of the follow: Neomycin, Polymyxin, Thimerosal, eggs or egg products? No    Flu vaccine given per order. Please see immunization tab. Risks and benefits explained. Current VIS given.       Immunizations Administered     Name Date Dose Route    Influenza, Quadv, adjuvanted, 65 yrs +, IM, PF (Fluad) 10/1/2020 0.5 mL Intramuscular    Site: Deltoid- Left    Lot: [id number]    NDC: 19129-086-35

## 2020-10-01 NOTE — LETTER
[address] [address] [address] [facility],[address] [place] [address]  Phone: [phone]  Fax: [phone]    Henry Pinto MD        October 1, 2020     Patient: Robert Lua   YOB: 1950   Date of Visit: 10/1/2020       To Whom It May Concern: It is my medical opinion that Goran Mustafa requires a disability parking placard for the following reasons:  She cannot walk 200 feet without stopping to rest.  Duration of need: 5 year    If you have any questions or concerns, please don't hesitate to call.     Sincerely,        Henry Pinto MD

## 2020-10-01 NOTE — PROGRESS NOTES
Garon Smoke  1950  10/10/20    Chief Complaint   Patient presents with    Dysphagia     x 1 month           Lump in the R side of the neck, not tender, noticed 1 month ago, causing difficult swallowing. No fever. Past Medical History:   Diagnosis Date    CAD (coronary artery disease)     Chest pain     CHF (congestive heart failure) (HCC)     Current smoker     Family history of coronary artery disease     H/O Doppler ultrasound 03/03/2017    Carotid-Daniele. Disease=0-49%    History of echocardiogram 09/06/2019    EF 55-60%, Normal    History of nuclear stress test 09/06/2019    EF 60%, Normal    Hx of cardiovascular stress test 11/21/2017    EF=75%, EKG positive for ischemia.  Hyperlipidemia     Hypertension     Post PTCA 03/08/2018    LAD & RCA stented-difficult; CX 50%.  Pulmonary emboli ([address] 1997 or 1998    S/P PTCA (percutaneous transluminal coronary angioplasty) 10/26/2011    LAD stented. Past Surgical History:   Procedure Laterality Date    BREAST LUMPECTOMY      COLONOSCOPY  08/29/2016    1 colon polyp    CORONARY ANGIOPLASTY  10826/2011    ptca with stent to LAD    DIAGNOSTIC CARDIAC CATH LAB PROCEDURE  2001    POLYPECTOMY      PTCA  2011    LAD stented.  PTCA  2018    LAD & RCA stented; Cx 50%.     ROTATOR CUFF REPAIR Left     SMALL INTESTINE SURGERY      JENNY AND BSO  2008     Family History   Problem Relation Age of Onset    Liver Disease Mother     Heart Disease Mother     Liver Cancer Sister         Alcoholic    Cancer Sister         Breast    Stroke Sister      Social History     Socioeconomic History    Marital status:      Spouse name: Not on file    Number of children: Not on file    Years of education: Not on file    Highest education level: Not on file   Occupational History    Not on file   Social Needs    Financial resource strain: Not on file    Food insecurity     Worry: Not on file     Inability: Not on file   [employer] needs     Medical: Not on file     Non-medical: Not on file   Tobacco Use    Smoking status: Current Some Day Smoker     Packs/day: 0.10     Years: 43.00     Pack years: 4.30     Types: Cigarettes    Smokeless tobacco: Never Used    Tobacco comment: 5 cigarettes a day    Substance and Sexual Activity    Alcohol use:  Yes     Alcohol/week: 12.0 standard drinks     Types: 12 Cans of beer per week     Comment: occassioanlly  weekends    Drug use: No    Sexual activity: Not Currently     Partners: Male   Lifestyle    Physical activity     Days per week: Not on file     Minutes per session: Not on file    Stress: Not on file   Relationships    Social connections     Talks on phone: Not on file     Gets together: Not on file     Attends Quaker service: Not on file     Active member of club or organization: Not on file     Attends meetings of clubs or organizations: Not on file     Relationship status: Not on file    Intimate partner violence     Fear of current or ex partner: Not on file     Emotionally abused: Not on file     Physically abused: Not on file     Forced sexual activity: Not on file   Other Topics Concern    Not on file   Social History Narrative    Not on file       Allergies   Allergen Reactions    Codeine Itching    Elavil [Amitriptyline] Other (See Comments)     Made her Crazy and caused sleep walking    Gabapentin Swelling    Morphine      restlessness    Other      pheldine    Percocet [Oxycodone-Acetaminophen]     Trazodone And Nefazodone Swelling     Current Outpatient Medications   Medication Sig Dispense Refill    losartan (COZAAR) 25 MG tablet Take 1 tablet by mouth 2 times daily 180 tablet 3    varenicline (CHANTIX STARTING MONTH PAK) 0.5 MG X 11 & 1 MG X 42 tablet TAKE 1 (0.5MG) TAB BY MOUTH DAILY FOR 3 DAYS, THEN TAKE 1 (0.5MG) TAB TWICE DAILY FOR 4 DAYS, THEN TAKE 1 (1MG) TAB TWICE DAILY THEREAFTER 1 box 0    nitroGLYCERIN (NITROLINGUAL) 0.4 MG/SPRAY 0.4 mg spray Place 02/11/2020    BUN 11 02/11/2020    CREATININE 1.1 10/06/2020    GLUCOSE 85 02/11/2020    CALCIUM 9.3 02/11/2020    PROT 7.1 02/11/2020    LABALBU 4.3 02/11/2020    BILITOT 0.2 02/11/2020    ALKPHOS 55 02/11/2020    AST 17 02/11/2020    ALT 8 (L) 02/11/2020    LABGLOM 52 (L) 10/06/2020    GFRAA >60 10/06/2020     Lab Results   Component Value Date    CHOL 213 (H) 07/15/2019    CHOL 223 (H) 03/03/2017    CHOL 220 (H) 07/22/2016     Lab Results   Component Value Date    TRIG 130 07/15/2019    TRIG 118 03/03/2017    TRIG 114 07/22/2016     Lab Results   Component Value Date    HDL 72 07/15/2019    HDL 67 03/03/2017    HDL 68 07/22/2016     No results found for: LDLCALC, LDLCHOLESTEROL  Lab Results   Component Value Date    LABA1C 5.4 07/15/2019     Lab Results   Component Value Date    TSHHS 1.860 07/15/2019         Temp 97 °F (36.1 °C)   Ht 5' 6\" (1.676 m)   Wt 136 lb 9.6 oz (62 kg)   BMI 22.05 kg/m²     BP Readings from Last 3 Encounters:   05/28/20 130/78   02/10/20 130/80   09/17/19 132/84       Wt Readings from Last 3 Encounters:   10/01/20 136 lb 9.6 oz (62 kg)   05/28/20 145 lb (65.8 kg)   02/10/20 150 lb 3.2 oz (68.1 kg)         Physical Exam  Constitutional:       General: She is not in acute distress. Appearance: She is not ill-appearing or diaphoretic. HENT:      Head: Normocephalic and atraumatic. Right Ear: Tympanic membrane, ear canal and external ear normal.      Left Ear: Tympanic membrane, ear canal and external ear normal.      Nose: Nose normal. No congestion. Eyes:      General: No scleral icterus. Extraocular Movements: Extraocular movements intact. Pupils: Pupils are equal, round, and reactive to light. Neck:      Musculoskeletal: Normal range of motion and neck supple. No neck rigidity or muscular tenderness (R side ). Cardiovascular:      Rate and Rhythm: Normal rate and regular rhythm. Heart sounds: No murmur.    Pulmonary:      Effort: Pulmonary effort is normal.      Breath sounds: Normal breath sounds. No wheezing. Neurological:      Mental Status: She is alert. ASSESSMENT/ PLAN:    1. Neck swelling  - Ct neck ordered    2. Needs flu shot  - tolerate the shot  - INFLUENZA, QUADV, ADJUVANTED, 72 YRS =, IM, PF, PREFILL SYR, 0.5ML (FLUAD)              - All old blood work reviewed with the patient  - Appropriate prescription are addressed. - After visit summery provided. - Questions answered and patient verbalizes understanding.  - Call for any problem, questions, or concerns.  - RTC if symptoms worse. No follow-ups on file.

## 2020-10-06 ENCOUNTER — HOSPITAL ENCOUNTER (OUTPATIENT)
Dept: WOMENS IMAGING | Age: 70
Discharge: HOME | End: 2020-10-06
Payer: COMMERCIAL

## 2020-10-06 ENCOUNTER — HOSPITAL ENCOUNTER (OUTPATIENT)
Dept: CT IMAGING | Age: 70
Discharge: HOME | End: 2020-10-06
Payer: COMMERCIAL

## 2020-10-06 LAB
GFR AFRICAN AMERICAN: >60 ML/MIN/1.73M2
GFR NON-AFRICAN AMERICAN: 52 ML/MIN/1.73M2
POC CREATININE: 1.1 MG/DL (ref 0.6–1.1)

## 2020-10-06 PROCEDURE — 77063 BREAST TOMOSYNTHESIS BI: CPT

## 2020-10-06 PROCEDURE — 2580000003 HC RX 258: Performed by: FAMILY MEDICINE

## 2020-10-06 PROCEDURE — 6360000004 HC RX CONTRAST MEDICATION: Performed by: FAMILY MEDICINE

## 2020-10-06 PROCEDURE — 70491 CT SOFT TISSUE NECK W/DYE: CPT

## 2020-10-06 RX ORDER — SODIUM CHLORIDE 0.9 % (FLUSH) 0.9 %
10 SYRINGE (ML) INJECTION PRN
Status: DISCONTINUED | OUTPATIENT
Start: 2020-10-06 | End: 2020-10-07 | Stop reason: HOSPADM

## 2020-10-06 RX ADMIN — IOPAMIDOL 75 ML: 755 INJECTION, SOLUTION INTRAVENOUS at 16:00

## 2020-10-06 RX ADMIN — SODIUM CHLORIDE, PRESERVATIVE FREE 10 ML: 5 INJECTION INTRAVENOUS at 16:10

## 2020-10-10 ASSESSMENT — ENCOUNTER SYMPTOMS
STRIDOR: 0
TROUBLE SWALLOWING: 1
ABDOMINAL PAIN: 0
NAUSEA: 0
SINUS PRESSURE: 0
SHORTNESS OF BREATH: 0
WHEEZING: 0
CHOKING: 0
VOMITING: 0
SINUS PAIN: 0
VOICE CHANGE: 0

## 2020-10-26 ENCOUNTER — TELEPHONE (OUTPATIENT)
Dept: FAMILY MEDICINE CLINIC | Age: 70
End: 2020-10-26

## 2020-11-05 PROBLEM — I65.23 BILATERAL CAROTID ARTERY STENOSIS: Status: ACTIVE | Noted: 2020-11-05

## 2020-12-01 ENCOUNTER — VIRTUAL VISIT (OUTPATIENT)
Dept: FAMILY MEDICINE CLINIC | Age: 70
End: 2020-12-01
Payer: COMMERCIAL

## 2020-12-01 PROCEDURE — 99442 PR PHYS/QHP TELEPHONE EVALUATION 11-20 MIN: CPT | Performed by: FAMILY MEDICINE

## 2020-12-01 RX ORDER — NYSTATIN 100000 [USP'U]/G
POWDER TOPICAL
Qty: 1 BOTTLE | Refills: 1 | Status: SHIPPED | OUTPATIENT
Start: 2020-12-01 | End: 2021-01-01

## 2020-12-01 NOTE — PROGRESS NOTES
Myrna Ramon is a 78 y.o. female evaluated via telephone on 12/1/2020. Consent:  She and/or health care decision maker is aware that that she may receive a bill for this telephone service, depending on her insurance coverage, and has provided verbal consent to proceed: Yes      Documentation:  I communicated with the patient:      Chief Complaint   Patient presents with    6 Month Follow-Up    Hypertension    COPD    Coronary Artery Disease    Anxiety    Depression         Patient seen for 6 months f/u regarding HTN, COPD, anxiety, depression, and CAD. The patient is taking hypertensive medications compliantly without side effects. Denies chest pain, dyspnea, edema, or TIA's. Her COPD stable, she still smoke, her CAD stable, needs medication refill, f/u with the neurology for her ALS. Her anxiety and depression under control. Patient also has intracranial aneurysm and already saw the neurology and recommend to see the neurosurgery. Past Medical History:   Diagnosis Date    CAD (coronary artery disease)     Chest pain     CHF (congestive heart failure) (HCC)     Current smoker     Family history of coronary artery disease     H/O Doppler ultrasound 03/03/2017    Carotid-Daniele. Disease=0-49%    History of echocardiogram 09/06/2019    EF 55-60%, Normal    History of nuclear stress test 09/06/2019    EF 60%, Normal    Hx of cardiovascular stress test 11/21/2017    EF=75%, EKG positive for ischemia.  Hyperlipidemia     Hypertension     Post PTCA 03/08/2018    LAD & RCA stented-difficult; CX 50%.  Pulmonary emboli ([address] 1997 or 1998    S/P PTCA (percutaneous transluminal coronary angioplasty) 10/26/2011    LAD stented.      Family History   Problem Relation Age of Onset    Liver Disease Mother     Heart Disease Mother     Liver Cancer Sister         Alcoholic    Cancer Sister         Breast    Stroke Sister      Social History     Socioeconomic History    Marital status:  Spouse name: Not on file    Number of children: Not on file    Years of education: Not on file    Highest education level: Not on file   Occupational History    Not on file   Social Needs    Financial resource strain: Not on file    Food insecurity     Worry: Not on file     Inability: Not on file    Transportation needs     Medical: Not on file     Non-medical: Not on file   Tobacco Use    Smoking status: Current Some Day Smoker     Packs/day: 0.10     Years: 43.00     Pack years: 4.30     Types: Cigarettes    Smokeless tobacco: Never Used    Tobacco comment: 5 cigarettes a day    Substance and Sexual Activity    Alcohol use:  Yes     Alcohol/week: 12.0 standard drinks     Types: 12 Cans of beer per week     Comment: occassioanlly  weekends    Drug use: No    Sexual activity: Not Currently     Partners: Male   Lifestyle    Physical activity     Days per week: Not on file     Minutes per session: Not on file    Stress: Not on file   Relationships    Social connections     Talks on phone: Not on file     Gets together: Not on file     Attends Orthodoxy service: Not on file     Active member of club or organization: Not on file     Attends meetings of clubs or organizations: Not on file     Relationship status: Not on file    Intimate partner violence     Fear of current or ex partner: Not on file     Emotionally abused: Not on file     Physically abused: Not on file     Forced sexual activity: Not on file   Other Topics Concern    Not on file   Social History Narrative    Not on file       Allergies   Allergen Reactions    Codeine Itching    Elavil [Amitriptyline] Other (See Comments)     Made her Crazy and caused sleep walking    Gabapentin Swelling    Morphine      restlessness    Other      pheldine    Percocet [Oxycodone-Acetaminophen]     Trazodone And Nefazodone Swelling     Current Outpatient Medications   Medication Sig Dispense Refill    nystatin (MYCOSTATIN) 105355 UNIT/GM powder Apply 2 times daily. To the affected area 1 Bottle 1    losartan (COZAAR) 25 MG tablet Take 1 tablet by mouth 2 times daily 180 tablet 3    varenicline (CHANTIX STARTING MONTH SUDHIR) 0.5 MG X 11 & 1 MG X 42 tablet TAKE 1 (0.5MG) TAB BY MOUTH DAILY FOR 3 DAYS, THEN TAKE 1 (0.5MG) TAB TWICE DAILY FOR 4 DAYS, THEN TAKE 1 (1MG) TAB TWICE DAILY THEREAFTER 1 box 0    nitroGLYCERIN (NITROLINGUAL) 0.4 MG/SPRAY 0.4 mg spray Place 1 spray under the tongue every 5 minutes as needed for Chest pain 1 Bottle 3    prasugrel (EFFIENT) 10 MG TABS Take 1 tablet by mouth daily 90 tablet 2    sertraline (ZOLOFT) 100 MG tablet Take 1 tablet by mouth daily 90 tablet 3    umeclidinium-vilanterol (ANORO ELLIPTA) 62.5-25 MCG/INH AEPB inhaler Inhale 1 puff into the lungs daily 3 each 3    rOPINIRole (REQUIP) 0.5 MG tablet Take 1 tablet by mouth 2 times daily 180 tablet 3    UNABLE TO FIND Rx for cane 1 Units 0    tiZANidine (ZANAFLEX) 2 MG tablet Take 2 mg by mouth every 6 hours as needed      clonazePAM (KLONOPIN) 0.5 MG tablet Take 0.5 mg by mouth daily as needed.  HYDROcodone-acetaminophen (NORCO) 5-325 MG per tablet       guaiFENesin (MUCINEX) 600 MG extended release tablet Take 1 tablet by mouth 2 times daily 60 tablet 5    Respiratory Therapy Supplies (NEBULIZER AIR TUBE/PLUGS) MISC 1 Tube by Does not apply route 4 times daily 1 each 0    albuterol (PROVENTIL) (2.5 MG/3ML) 0.083% nebulizer solution Take 3 mLs by nebulization 4 times daily 120 each 3    aspirin 81 MG EC tablet Take 81 mg by mouth daily. No current facility-administered medications for this visit.           Review of Systems   General:  No fevers, no chills  Eyes:  No recent vison changes  ENT:  No sore throat, no nasal congestion  Cardiovascular:  No chest pain, no palpitations  Respiratory:  No shortness of breath, no cough, no wheezing  Gastrointestinal:  No abdominal pain, no nausea, no vomiting, no diarrhea  Musculoskeletal:  No muscle pain, no joint pain  Skin:  No rash  Neurologic:  No headache or weakness  Genitourinary:  No dysuria, no hematuria  Extremities:  no edema, no pain     Physical Exam     Vital signs: not taken    Per phone call, the voice sounds good, no acute distress      ASSESSMENT/ PLAN:      1. Essential hypertension  - stable    2. Coronary artery disease involving native coronary artery of native heart without angina pectoris  - stable    3. Chronic obstructive pulmonary disease, unspecified COPD type (HCC)  - stable    4. Intracranial aneurysm  - External Referral To Neurosurgery    5. STANLEY (generalized anxiety disorder)  - stable      She also get the refill for her rash under the breasts    - Appropriate prescription are addressed. - Questions answered and patient verbalizes understanding.  - Call for any problem, questions, or concerns. Return in about 3 months (around 3/1/2021) for OV. I affirm this is a Patient Initiated Episode with a Patient who has not had a related appointment within my department in the past 7 days or scheduled within the next 24 hours.     Patient identification was verified at the start of the visit: Yes    Total Time: 15 minutes    Note: not billable if this call serves to triage the patient into an appointment for the relevant concern      Frederick Palomino

## 2020-12-02 ENCOUNTER — TELEPHONE (OUTPATIENT)
Dept: FAMILY MEDICINE CLINIC | Age: 70
End: 2020-12-02

## 2020-12-02 PROBLEM — I67.1 INTRACRANIAL ANEURYSM: Status: ACTIVE | Noted: 2020-12-02

## 2020-12-08 ENCOUNTER — TELEPHONE (OUTPATIENT)
Dept: FAMILY MEDICINE CLINIC | Age: 70
End: 2020-12-08

## 2020-12-08 NOTE — TELEPHONE ENCOUNTER
Received a notification that Dr Avani Plata office will not schedule until there is a recent as of 1 year MRI. Patient has not had one so they won't see her. Please Advise.

## 2020-12-15 NOTE — TELEPHONE ENCOUNTER
The new NeuroSurg is Dr Ana Padilla can we reorder with him on referral and I will get sent out today.

## 2020-12-16 ENCOUNTER — TELEPHONE (OUTPATIENT)
Dept: FAMILY MEDICINE CLINIC | Age: 70
End: 2020-12-16

## 2020-12-29 NOTE — TELEPHONE ENCOUNTER
Dr José Luis Davis is not at this office, We have Dr Lavonne Bell, [facility], Office address is [address] [place] [address], [address], [place], [address]. I dont know where Dr José Luis Davis is located.

## 2021-01-01 ENCOUNTER — OFFICE VISIT (OUTPATIENT)
Dept: NEUROLOGY | Age: 71
End: 2021-01-01
Payer: COMMERCIAL

## 2021-01-01 ENCOUNTER — TELEPHONE (OUTPATIENT)
Dept: FAMILY MEDICINE CLINIC | Age: 71
End: 2021-01-01

## 2021-01-01 ENCOUNTER — TELEPHONE (OUTPATIENT)
Dept: CARDIOLOGY CLINIC | Age: 71
End: 2021-01-01

## 2021-01-01 ENCOUNTER — OFFICE VISIT (OUTPATIENT)
Dept: CARDIOLOGY CLINIC | Age: 71
End: 2021-01-01
Payer: COMMERCIAL

## 2021-01-01 ENCOUNTER — TELEPHONE (OUTPATIENT)
Dept: NEUROLOGY | Age: 71
End: 2021-01-01

## 2021-01-01 ENCOUNTER — HOSPITAL ENCOUNTER (OUTPATIENT)
Dept: CT IMAGING | Age: 71
Discharge: HOME | End: 2021-08-09
Payer: COMMERCIAL

## 2021-01-01 VITALS
HEART RATE: 85 BPM | BODY MASS INDEX: 21.24 KG/M2 | SYSTOLIC BLOOD PRESSURE: 118 MMHG | HEIGHT: 66 IN | OXYGEN SATURATION: 95 % | WEIGHT: 132.2 LBS | DIASTOLIC BLOOD PRESSURE: 70 MMHG

## 2021-01-01 VITALS
HEART RATE: 81 BPM | OXYGEN SATURATION: 99 % | WEIGHT: 135 LBS | DIASTOLIC BLOOD PRESSURE: 80 MMHG | BODY MASS INDEX: 21.69 KG/M2 | HEIGHT: 66 IN | SYSTOLIC BLOOD PRESSURE: 128 MMHG

## 2021-01-01 VITALS
SYSTOLIC BLOOD PRESSURE: 126 MMHG | BODY MASS INDEX: 21.47 KG/M2 | WEIGHT: 133.6 LBS | DIASTOLIC BLOOD PRESSURE: 72 MMHG | HEART RATE: 75 BPM | HEIGHT: 66 IN

## 2021-01-01 DIAGNOSIS — I65.29 STENOSIS OF CAROTID ARTERY, UNSPECIFIED LATERALITY: ICD-10-CM

## 2021-01-01 DIAGNOSIS — Z86.79 H/O ANEURYSM: ICD-10-CM

## 2021-01-01 DIAGNOSIS — I67.1 INTRACRANIAL ANEURYSM: ICD-10-CM

## 2021-01-01 DIAGNOSIS — I67.1 INTRACRANIAL ANEURYSM: Primary | ICD-10-CM

## 2021-01-01 DIAGNOSIS — R07.9 CHEST PAIN, UNSPECIFIED TYPE: ICD-10-CM

## 2021-01-01 DIAGNOSIS — Z98.61 S/P PTCA (PERCUTANEOUS TRANSLUMINAL CORONARY ANGIOPLASTY): Primary | ICD-10-CM

## 2021-01-01 DIAGNOSIS — G25.81 RESTLESS LEG: ICD-10-CM

## 2021-01-01 DIAGNOSIS — G43.009 MIGRAINE WITHOUT AURA AND WITHOUT STATUS MIGRAINOSUS, NOT INTRACTABLE: ICD-10-CM

## 2021-01-01 DIAGNOSIS — H53.2 DIPLOPIA: ICD-10-CM

## 2021-01-01 DIAGNOSIS — I65.29 STENOSIS OF CAROTID ARTERY, UNSPECIFIED LATERALITY: Primary | ICD-10-CM

## 2021-01-01 DIAGNOSIS — J44.9 CHRONIC OBSTRUCTIVE PULMONARY DISEASE, UNSPECIFIED COPD TYPE (HCC): ICD-10-CM

## 2021-01-01 DIAGNOSIS — G70.00 MYASTHENIA GRAVIS (HCC): ICD-10-CM

## 2021-01-01 DIAGNOSIS — I10 HYPERTENSION, UNSPECIFIED TYPE: ICD-10-CM

## 2021-01-01 DIAGNOSIS — G62.9 PERIPHERAL NERVE DISEASE: Primary | ICD-10-CM

## 2021-01-01 DIAGNOSIS — I25.119 CORONARY ARTERY DISEASE INVOLVING NATIVE CORONARY ARTERY OF NATIVE HEART WITH ANGINA PECTORIS (HCC): ICD-10-CM

## 2021-01-01 DIAGNOSIS — G25.0 ESSENTIAL TREMOR: ICD-10-CM

## 2021-01-01 DIAGNOSIS — G70.00 MYASTHENIA GRAVIS (HCC): Primary | ICD-10-CM

## 2021-01-01 DIAGNOSIS — G47.33 OBSTRUCTIVE SLEEP APNEA: ICD-10-CM

## 2021-01-01 LAB
ANION GAP 4: 12 MMOL/L (ref 7–16)
APTT: 31 SEC (ref 26.2–36.4)
BASOPHILS # BLD: 0.8 %
BASOPHILS ABSOLUTE: 0 K/UL (ref 0–0.1)
BUN BLDV-MCNC: 14 MG/DL (ref 7–25)
CALCIUM SERPL-MCNC: 9.6 MG/DL (ref 8.6–10.2)
CHLORIDE BLD-SCNC: 90 MMOL/L (ref 98–107)
CO2: 28 MMOL/L (ref 21–31)
COAGULATION TISSUE FACTOR INDUCED BLD TIME PT. COAG: 10 SECONDS (ref 9.9–12.6)
CREATININE + EGFR PANEL: 1 MG/DL (ref 0.6–1.2)
EOSINOPHILS ABSOLUTE: 0.2 K/UL (ref 0–0.4)
EOSINOPHILS RELATIVE PERCENT: 2.4 %
GFR AFRICAN AMERICAN: 57 ML/MIN/1.73M2
GFR CALCULATED: 55 ML/MIN/1.73M2
GFR CALCULATED: > 60 ML/MIN/1.73M2
GFR NON-AFRICAN AMERICAN: 47 ML/MIN/1.73M2
GLUCOSE: 98 MG/DL (ref 74–109)
HEMOGLOBIN URINE, QUAL: 12.8 G/DL (ref 12.1–15.8)
INR BLD: 0.9 (ref 0.9–1.1)
LYMPHOCYTES # BLD: 26.9 %
LYMPHOCYTES ABSOLUTE: 1.7 K/UL (ref 0.8–3.6)
MCH RBC QN AUTO: 36 PG (ref 28.4–33.4)
MCHC RBC AUTO-ENTMCNC: 35 G/DL (ref 31.1–37)
MCV RBC AUTO: 102.9 FL (ref 85–99)
MONOCYTES # BLD: 7.1 %
MONOCYTES ABSOLUTE: 0.5 K/UL (ref 0.3–0.9)
NEUTROPHILS ABSOLUTE: 4.1 K/UL (ref 2–7.3)
NEUTROPHILS/100 LEUKOCYTES: 62.8 %
PDW BLD-RTO: 13 % (ref 11.7–15.2)
PLATELET COUNT MANUAL: 274 K/UL (ref 154–393)
POC CREATININE: 1.1 MG/DL (ref 0.6–1.1)
POTASSIUM SERPL-SCNC: 3.2 MMOL/L (ref 3.5–5.3)
RBC # BLD: 3.56 M/UL (ref 3.86–5.17)
SODIUM BLD-SCNC: 130 MMOL/L (ref 136–145)
SR HEMATOCRIT: 36.6 % (ref 35.8–46.5)
WBC BLOOD, MANUAL: 6.5 K/UL (ref 4–10.5)

## 2021-01-01 PROCEDURE — 6360000004 HC RX CONTRAST MEDICATION: Performed by: INTERNAL MEDICINE

## 2021-01-01 PROCEDURE — 99214 OFFICE O/P EST MOD 30 MIN: CPT | Performed by: INTERNAL MEDICINE

## 2021-01-01 PROCEDURE — 70496 CT ANGIOGRAPHY HEAD: CPT

## 2021-01-01 PROCEDURE — 70498 CT ANGIOGRAPHY NECK: CPT

## 2021-01-01 PROCEDURE — 93000 ELECTROCARDIOGRAM COMPLETE: CPT | Performed by: INTERNAL MEDICINE

## 2021-01-01 PROCEDURE — 99214 OFFICE O/P EST MOD 30 MIN: CPT | Performed by: PHYSICIAN ASSISTANT

## 2021-01-01 RX ORDER — NYSTATIN 100000 [USP'U]/G
POWDER TOPICAL
Qty: 15 G | Refills: 3 | Status: SHIPPED | OUTPATIENT
Start: 2021-01-01

## 2021-01-01 RX ORDER — SODIUM CHLORIDE 0.9 % (FLUSH) 0.9 %
5-40 SYRINGE (ML) INJECTION PRN
Status: DISCONTINUED | OUTPATIENT
Start: 2021-01-01 | End: 2021-01-01 | Stop reason: HOSPADM

## 2021-01-01 RX ORDER — UMECLIDINIUM BROMIDE AND VILANTEROL TRIFENATATE 62.5; 25 UG/1; UG/1
POWDER RESPIRATORY (INHALATION)
Qty: 1 EACH | Refills: 5 | Status: SHIPPED | OUTPATIENT
Start: 2021-01-01

## 2021-01-01 RX ORDER — PREDNISONE 10 MG/1
10 TABLET ORAL DAILY
Qty: 14 TABLET | Refills: 0 | Status: SHIPPED | OUTPATIENT
Start: 2021-01-01 | End: 2021-01-01

## 2021-01-01 RX ORDER — HYDROCHLOROTHIAZIDE 25 MG/1
25 TABLET ORAL EVERY MORNING
Qty: 30 TABLET | Refills: 5 | Status: SHIPPED | OUTPATIENT
Start: 2021-01-01

## 2021-01-01 RX ORDER — NITROGLYCERIN 400 UG/1
1 SPRAY ORAL EVERY 5 MIN PRN
Qty: 1 BOTTLE | Refills: 3 | Status: SHIPPED | OUTPATIENT
Start: 2021-01-01

## 2021-01-01 RX ORDER — UMECLIDINIUM BROMIDE AND VILANTEROL TRIFENATATE 62.5; 25 UG/1; UG/1
POWDER RESPIRATORY (INHALATION)
Qty: 1 EACH | Refills: 5 | Status: SHIPPED | OUTPATIENT
Start: 2021-01-01 | End: 2021-01-01 | Stop reason: SDUPTHER

## 2021-01-01 RX ORDER — TOPIRAMATE 25 MG/1
CAPSULE, EXTENDED RELEASE ORAL
COMMUNITY
Start: 2021-01-01

## 2021-01-01 RX ORDER — ENALAPRIL MALEATE 5 MG/1
5 TABLET ORAL DAILY
Qty: 30 TABLET | Refills: 3 | Status: SHIPPED | OUTPATIENT
Start: 2021-01-01 | End: 2021-01-01 | Stop reason: ALTCHOICE

## 2021-01-01 RX ORDER — PRASUGREL 10 MG/1
10 TABLET, FILM COATED ORAL DAILY
Qty: 90 TABLET | Refills: 2 | Status: SHIPPED | OUTPATIENT
Start: 2021-01-01

## 2021-01-01 RX ORDER — TIZANIDINE 4 MG/1
TABLET ORAL
COMMUNITY
Start: 2021-01-01 | End: 2021-01-01

## 2021-01-01 RX ADMIN — IOPAMIDOL 95 ML: 755 INJECTION, SOLUTION INTRAVENOUS at 13:45

## 2021-01-01 ASSESSMENT — ENCOUNTER SYMPTOMS
CHOKING: 0
WHEEZING: 0
SINUS PAIN: 1
STRIDOR: 0
COUGH: 1
NAUSEA: 0
SINUS PRESSURE: 1
ABDOMINAL PAIN: 0
SHORTNESS OF BREATH: 0
VOMITING: 0

## 2021-01-04 NOTE — TELEPHONE ENCOUNTER
Will double check with Edwige green mgr on this provider.  There was supposed to be a new neuro I have the wrong office will get it resent

## 2021-01-05 NOTE — TELEPHONE ENCOUNTER
Bettye Cooper MA received VM yesterday stating that Dr Fatmata Shah could see the patient that previous message was an error and to refax the referral. Even called and verified fax. Resending.

## 2021-01-11 ENCOUNTER — OFFICE VISIT (OUTPATIENT)
Dept: CARDIOLOGY CLINIC | Age: 71
End: 2021-01-11
Payer: COMMERCIAL

## 2021-01-11 VITALS
WEIGHT: 139 LBS | HEIGHT: 67 IN | BODY MASS INDEX: 21.82 KG/M2 | HEART RATE: 68 BPM | SYSTOLIC BLOOD PRESSURE: 146 MMHG | DIASTOLIC BLOOD PRESSURE: 90 MMHG

## 2021-01-11 DIAGNOSIS — E78.49 OTHER HYPERLIPIDEMIA: ICD-10-CM

## 2021-01-11 DIAGNOSIS — R07.9 CHEST PAIN, UNSPECIFIED TYPE: Primary | ICD-10-CM

## 2021-01-11 PROCEDURE — 99214 OFFICE O/P EST MOD 30 MIN: CPT | Performed by: INTERNAL MEDICINE

## 2021-01-11 PROCEDURE — 93000 ELECTROCARDIOGRAM COMPLETE: CPT | Performed by: INTERNAL MEDICINE

## 2021-01-11 RX ORDER — PRASUGREL 10 MG/1
10 TABLET, FILM COATED ORAL DAILY
Qty: 90 TABLET | Refills: 2 | Status: SHIPPED | OUTPATIENT
Start: 2021-01-11 | End: 2021-01-01 | Stop reason: SDUPTHER

## 2021-01-11 RX ORDER — NITROGLYCERIN 0.4 MG/1
0.4 TABLET SUBLINGUAL EVERY 5 MIN PRN
COMMUNITY
End: 2021-01-01 | Stop reason: DRUGHIGH

## 2021-01-11 NOTE — LETTER
Dejah Armstrong Henri Alfonso Self  1950  [id number]    Have you had any Chest Pain that is not new? - Yes  If Yes DO EKG - How does it feel - Tightness & tightness  How long does the pain last - 5 minutes    How long have you been having the pain - Months   Did you take a Nitro   And did it relieve the pain - Yes    Have you had any Shortness of Breath - Yes  If Yes - When bending over    Have you had any dizziness - Yes, due to aneurysm, ongoing, occasional, unchanged  If Yes DO ORTHOSTATIC BP - when do you feel dizzy random   How long does it last .3  seconds     ? Sitting wait 5 minutes do supine (laying down) wait 5 minutes then do standing - log each in \"vitals\" area in Epic  ? Be sure to ask what symptoms they are having if they get dizzy while completing ortho stats such as room spinning, nausea, etc.    Have you had any palpitations that are not new? - Yes  If Yes DO EKG - Do you feel your heart fluttering  How long does it last - .3  seconds     Is the patient on any of the following medications - NONE  If Yes DO EKG - Needs done every 6 months    Do you have any edema - swelling in feet    If Yes - CHECK TO SEE IF THE EDEMA IS PITTING  How long have they been having edema - Months       Do you have a surgery or procedure scheduled in the near future - No  ? If Yes- DO EKGAsk patient if they want to sign up for MyChart if they are not already signed up    ? Check to see if we have an E-MAIL on file for the patient    ? Check medication list thoroughly!!! AND RECONCILE OUTSIDE MEDICATIONS  If dose has changed change the entire order not just the MG  BE SURE TO ASK PATIENT IF THEY NEED MEDICATION REFILLS    ?  At check out add to every patient's \"wrap up\" the following dot phrase AFTERHOURSEDUCATION and ensure we explain this to our patients

## 2021-01-11 NOTE — PROGRESS NOTES
Priya Rapp  is a  Established patient  ,78 y.o.   female here for evaluation of the following chief complaint(s):    cad        SUBJECTIVE/OBJECTIVE:  HPI : h/o  Cad, htn, hyperlipidimea now here  [address]    Review of Systems    NEG    Vitals:    01/11/21 1650 01/11/21 1659   BP: (!) 148/90 (!) 146/90   Site: Left Upper Arm Right Upper Arm   Position: Sitting Sitting   Cuff Size: Medium Adult Medium Adult   Pulse: 68    Weight: 139 lb (63 kg)    Height: 5' 6.5\" (1.689 m)      BP (!) 146/90 (Site: Right Upper Arm, Position: Sitting, Cuff Size: Medium Adult)   Pulse 68   Ht 5' 6.5\" (1.689 m)   Wt 139 lb (63 kg)   BMI 22.10 kg/m²   Patient-Reported Vitals 6/18/2020   Patient-Reported Weight 145 lbs   Patient-Reported Height 5 6     Wt Readings from Last 3 Encounters:   01/11/21 139 lb (63 kg)   11/12/20 139 lb (63 kg)   11/05/20 139 lb (63 kg)     Body mass index is 22.1 kg/m². Physical Exam     Neck: JVD      Lungs : clear    Cardio : Si and S2 audilble     Ext: edema      All pertinent data reviewed      Meds : reviewed         Tests ordered        ASSESSMENT/PLAN:               -     CORONARY ARTERY DISEASE:  asymptomatic     All available  tests in chart reviewed. Management discussed . Testing ordered  no                               -  Hypertension: Patients blood pressure is normal. Patient is advised about low sodium diet. Present medical regimen will not be changed. -  LIPID MANAGEMENT:  Importance of lipid levels discussed with patient   and patient was given dietary advice. NCEP- ATP III guidelines reviewed with patient. -   Changes  in medicines made: No                         - HAS IC ANEURYSM    Per neurosurgeon, has  carotid stenosis per CTs                               An electronic signature was used to authenticate this note.     --Latricia Lincoln MD

## 2021-01-29 DIAGNOSIS — F41.1 GAD (GENERALIZED ANXIETY DISORDER): ICD-10-CM

## 2021-02-01 RX ORDER — SERTRALINE HYDROCHLORIDE 100 MG/1
100 TABLET, FILM COATED ORAL DAILY
Qty: 90 TABLET | Refills: 3 | Status: SHIPPED | OUTPATIENT
Start: 2021-02-01

## 2021-02-02 ENCOUNTER — HOSPITAL ENCOUNTER (OUTPATIENT)
Age: 71
Discharge: HOME | End: 2021-02-02
Payer: COMMERCIAL

## 2021-02-02 LAB
CHOLESTEROL: 218 MG/DL
HDLC SERPL-MCNC: 58 MG/DL
LDL CHOLESTEROL DIRECT: 145 MG/DL
TRIGL SERPL-MCNC: 141 MG/DL

## 2021-02-02 PROCEDURE — 36415 COLL VENOUS BLD VENIPUNCTURE: CPT

## 2021-02-02 PROCEDURE — 80061 LIPID PANEL: CPT

## 2021-02-02 PROCEDURE — 83721 ASSAY OF BLOOD LIPOPROTEIN: CPT

## 2021-02-05 ENCOUNTER — OFFICE VISIT (OUTPATIENT)
Dept: FAMILY MEDICINE CLINIC | Age: 71
End: 2021-02-05
Payer: COMMERCIAL

## 2021-02-05 VITALS
OXYGEN SATURATION: 97 % | DIASTOLIC BLOOD PRESSURE: 88 MMHG | BODY MASS INDEX: 21.5 KG/M2 | HEIGHT: 67 IN | TEMPERATURE: 97.6 F | SYSTOLIC BLOOD PRESSURE: 138 MMHG | WEIGHT: 137 LBS | HEART RATE: 86 BPM

## 2021-02-05 DIAGNOSIS — J01.90 ACUTE BACTERIAL SINUSITIS: ICD-10-CM

## 2021-02-05 DIAGNOSIS — F41.1 GAD (GENERALIZED ANXIETY DISORDER): ICD-10-CM

## 2021-02-05 DIAGNOSIS — I10 ESSENTIAL HYPERTENSION: Primary | ICD-10-CM

## 2021-02-05 DIAGNOSIS — I25.10 CORONARY ARTERY DISEASE INVOLVING NATIVE CORONARY ARTERY OF NATIVE HEART WITHOUT ANGINA PECTORIS: ICD-10-CM

## 2021-02-05 DIAGNOSIS — J44.9 CHRONIC OBSTRUCTIVE PULMONARY DISEASE, UNSPECIFIED COPD TYPE (HCC): ICD-10-CM

## 2021-02-05 DIAGNOSIS — B96.89 ACUTE BACTERIAL SINUSITIS: ICD-10-CM

## 2021-02-05 PROCEDURE — 99214 OFFICE O/P EST MOD 30 MIN: CPT | Performed by: FAMILY MEDICINE

## 2021-02-05 RX ORDER — FLUCONAZOLE 150 MG/1
150 TABLET ORAL ONCE
Qty: 1 TABLET | Refills: 0 | Status: SHIPPED | OUTPATIENT
Start: 2021-02-05 | End: 2021-02-05

## 2021-02-05 RX ORDER — DOXYCYCLINE HYCLATE 100 MG
100 TABLET ORAL 2 TIMES DAILY
Qty: 14 TABLET | Refills: 0 | Status: SHIPPED | OUTPATIENT
Start: 2021-02-05 | End: 2021-01-01

## 2021-02-05 ASSESSMENT — PATIENT HEALTH QUESTIONNAIRE - PHQ9
SUM OF ALL RESPONSES TO PHQ QUESTIONS 1-9: 2
SUM OF ALL RESPONSES TO PHQ QUESTIONS 1-9: 2
2. FEELING DOWN, DEPRESSED OR HOPELESS: 1

## 2021-02-05 NOTE — PROGRESS NOTES
Livia Crabtreeana  1950 02/13/21    Chief Complaint   Patient presents with    3 Month Follow-Up    Hypertension    COPD    Anxiety    Depression    Coronary Artery Disease    Other     c/o sinus problem           Patient here for 3 months f/u regarding HTN, COPD, anxiety, depression, and CAD. The patient is taking hypertensive medications compliantly without side effects. Denies chest pain, dyspnea, edema, or TIA's. Her COPD stable, she still smoke, her CAD stable. Her anxiety and depression under control. Patient also has intracranial aneurysm and going to see the neurosurgery. Patient also c/o congestion, cough, sinus pressure going on for more than a wk, no fever, no SOB. No sick contact, no smell or taste loss. Past Medical History:   Diagnosis Date    CAD (coronary artery disease)     Chest pain     CHF (congestive heart failure) (HCC)     Current smoker     Family history of coronary artery disease     H/O Doppler ultrasound 03/03/2017    Carotid-Daniele. Disease=0-49%    History of echocardiogram 09/06/2019    EF 55-60%, Normal    History of nuclear stress test 09/06/2019    EF 60%, Normal    Hx of cardiovascular stress test 11/21/2017    EF=75%, EKG positive for ischemia.  Hyperlipidemia     Hypertension     Post PTCA 03/08/2018    LAD & RCA stented-difficult; CX 50%.  Pulmonary emboli ([address] 1997 or 1998    S/P PTCA (percutaneous transluminal coronary angioplasty) 10/26/2011    LAD stented. Past Surgical History:   Procedure Laterality Date    BREAST LUMPECTOMY      COLONOSCOPY  08/29/2016    1 colon polyp    CORONARY ANGIOPLASTY  10826/2011    ptca with stent to LAD    DIAGNOSTIC CARDIAC CATH LAB PROCEDURE  2001    POLYPECTOMY      PTCA  2011    LAD stented.  PTCA  2018    LAD & RCA stented; Cx 50%.     ROTATOR CUFF REPAIR Left     SMALL INTESTINE SURGERY      JENNY AND BSO  2008     Family History   Problem Relation Age of Onset    Liver Disease Mother    [facility] Heart Disease Mother     Liver Cancer Sister         Alcoholic    Cancer Sister         Breast    Stroke Sister      Social History     Socioeconomic History    Marital status:      Spouse name: Not on file    Number of children: Not on file    Years of education: Not on file    Highest education level: Not on file   Occupational History    Not on file   Social Needs    Financial resource strain: Not on file    Food insecurity     Worry: Not on file     Inability: Not on file   [employer] needs     Medical: Not on file     Non-medical: Not on file   Tobacco Use    Smoking status: Current Some Day Smoker     Packs/day: 0.10     Years: 43.00     Pack years: 4.30     Types: Cigarettes    Smokeless tobacco: Never Used    Tobacco comment: 5 cigarettes a day    Substance and Sexual Activity    Alcohol use:  Yes     Alcohol/week: 12.0 standard drinks     Types: 12 Cans of beer per week     Comment: occassioanlly  weekends    Drug use: No    Sexual activity: Not Currently     Partners: Male   Lifestyle    Physical activity     Days per week: Not on file     Minutes per session: Not on file    Stress: Not on file   Relationships    Social connections     Talks on phone: Not on file     Gets together: Not on file     Attends Evangelical service: Not on file     Active member of club or organization: Not on file     Attends meetings of clubs or organizations: Not on file     Relationship status: Not on file    Intimate partner violence     Fear of current or ex partner: Not on file     Emotionally abused: Not on file     Physically abused: Not on file     Forced sexual activity: Not on file   Other Topics Concern    Not on file   Social History Narrative    Not on file       Allergies   Allergen Reactions    Codeine Itching    Elavil [Amitriptyline] Other (See Comments)     Made her Crazy and caused sleep walking    Gabapentin Swelling    Morphine      restlessness    Other      pheldine  Percocet [Oxycodone-Acetaminophen]     Trazodone And Nefazodone Swelling     Current Outpatient Medications   Medication Sig Dispense Refill    sertraline (ZOLOFT) 100 MG tablet Take 1 tablet by mouth daily 90 tablet 3    nitroGLYCERIN (NITROSTAT) 0.4 MG SL tablet Place 0.4 mg under the tongue every 5 minutes as needed for Chest pain up to max of 3 total doses. If no relief after 1 dose, call 911.  prasugrel (EFFIENT) 10 MG TABS Take 1 tablet by mouth daily 90 tablet 2    nystatin (MYCOSTATIN) 975840 UNIT/GM powder Apply 2 times daily. To the affected area 1 Bottle 1    losartan (COZAAR) 25 MG tablet Take 1 tablet by mouth 2 times daily 180 tablet 3    varenicline (CHANTIX STARTING MONTH PAK) 0.5 MG X 11 & 1 MG X 42 tablet TAKE 1 (0.5MG) TAB BY MOUTH DAILY FOR 3 DAYS, THEN TAKE 1 (0.5MG) TAB TWICE DAILY FOR 4 DAYS, THEN TAKE 1 (1MG) TAB TWICE DAILY THEREAFTER 1 box 0    umeclidinium-vilanterol (ANORO ELLIPTA) 62.5-25 MCG/INH AEPB inhaler Inhale 1 puff into the lungs daily 3 each 3    rOPINIRole (REQUIP) 0.5 MG tablet Take 1 tablet by mouth 2 times daily 180 tablet 3    UNABLE TO FIND Rx for cane 1 Units 0    tiZANidine (ZANAFLEX) 2 MG tablet Take 2 mg by mouth every 6 hours as needed      clonazePAM (KLONOPIN) 0.5 MG tablet Take 0.5 mg by mouth daily as needed.  HYDROcodone-acetaminophen (NORCO) 5-325 MG per tablet       guaiFENesin (MUCINEX) 600 MG extended release tablet Take 1 tablet by mouth 2 times daily 60 tablet 5    Respiratory Therapy Supplies (NEBULIZER AIR TUBE/PLUGS) MISC 1 Tube by Does not apply route 4 times daily 1 each 0    albuterol (PROVENTIL) (2.5 MG/3ML) 0.083% nebulizer solution Take 3 mLs by nebulization 4 times daily 120 each 3    aspirin 81 MG EC tablet Take 81 mg by mouth daily. No current facility-administered medications for this visit. Review of Systems   Constitutional: Negative for activity change, appetite change, chills, fatigue and fever. HENT: Positive for congestion, sinus pressure and sinus pain. Negative for ear pain. Respiratory: Positive for cough. Negative for choking, shortness of breath, wheezing and stridor. Cardiovascular: Negative for chest pain and leg swelling. Gastrointestinal: Negative for abdominal pain, nausea and vomiting. Neurological: Negative for dizziness and headaches.        Lab Results   Component Value Date    WBC 8.4 07/15/2019    HGB 14.0 07/15/2019    HCT 42.3 07/15/2019    .8 (H) 07/15/2019     07/15/2019     Lab Results   Component Value Date     (L) 02/11/2020    K 4.6 02/11/2020    CL 96 (L) 02/11/2020    CO2 24 02/11/2020    BUN 11 02/11/2020    CREATININE 1.1 10/06/2020    GLUCOSE 85 02/11/2020    CALCIUM 9.3 02/11/2020    PROT 7.1 02/11/2020    LABALBU 4.3 02/11/2020    BILITOT 0.2 02/11/2020    ALKPHOS 55 02/11/2020    AST 17 02/11/2020    ALT 8 (L) 02/11/2020    LABGLOM 52 (L) 10/06/2020    GFRAA >60 10/06/2020     Lab Results   Component Value Date    CHOL 218 (H) 02/02/2021    CHOL 213 (H) 07/15/2019    CHOL 223 (H) 03/03/2017     Lab Results   Component Value Date    TRIG 141 02/02/2021    TRIG 130 07/15/2019    TRIG 118 03/03/2017     Lab Results   Component Value Date    HDL 58 02/02/2021    HDL 72 07/15/2019    HDL 67 03/03/2017     No results found for: LDLCALC, LDLCHOLESTEROL  Lab Results   Component Value Date    LABA1C 5.4 07/15/2019     Lab Results   Component Value Date    TSHHS 1.860 07/15/2019         /88 (Site: Left Upper Arm, Position: Sitting, Cuff Size: Medium Adult)   Pulse 86   Temp 97.6 °F (36.4 °C)   Ht 5' 6.5\" (1.689 m)   Wt 137 lb (62.1 kg)   SpO2 97%   BMI 21.78 kg/m²     BP Readings from Last 3 Encounters:   02/05/21 138/88   01/11/21 (!) 146/90   11/12/20 (!) 152/90       Wt Readings from Last 3 Encounters:   02/05/21 137 lb (62.1 kg)   01/11/21 139 lb (63 kg)   11/12/20 139 lb (63 kg)         Physical Exam  Constitutional:       General: She is not in acute distress. Appearance: Normal appearance. She is not ill-appearing or diaphoretic. HENT:      Head: Normocephalic and atraumatic. Eyes:      General: No scleral icterus. Extraocular Movements: Extraocular movements intact. Pupils: Pupils are equal, round, and reactive to light. Neck:      Musculoskeletal: Normal range of motion and neck supple. No neck rigidity. Cardiovascular:      Rate and Rhythm: Normal rate and regular rhythm. Heart sounds: No murmur. Pulmonary:      Effort: Pulmonary effort is normal.      Breath sounds: Normal breath sounds. No wheezing. Neurological:      Mental Status: She is alert and oriented to person, place, and time. Psychiatric:         Mood and Affect: Mood normal.         Behavior: Behavior normal.         ASSESSMENT/ PLAN:    1. Essential hypertension  - stable    2. Chronic obstructive pulmonary disease, unspecified COPD type (HCC)  - stable    3. Coronary artery disease involving native coronary artery of native heart without angina pectoris  - stable    4. STANLEY (generalized anxiety disorder)  - stable    5. Acute bacterial sinusitis  - start:  - doxycycline hyclate (VIBRA-TABS) 100 MG tablet; Take 1 tablet by mouth 2 times daily for 7 days  Dispense: 14 tablet; Refill: 0  - fluconazole (DIFLUCAN) 150 MG tablet; Take 1 tablet by mouth once for 1 dose  Dispense: 1 tablet; Refill: 0              - All old blood work reviewed with the patient  - Appropriate prescription are addressed. - After visit summery provided. - Questions answered and patient verbalizes understanding.  - Call for any problem, questions, or concerns.  - RTC if symptoms worse. Return in about 6 months (around 8/5/2021).

## 2021-02-13 PROBLEM — J01.90 ACUTE BACTERIAL SINUSITIS: Status: ACTIVE | Noted: 2021-01-01

## 2021-02-13 PROBLEM — B96.89 ACUTE BACTERIAL SINUSITIS: Status: ACTIVE | Noted: 2021-01-01

## 2021-07-14 NOTE — PATIENT INSTRUCTIONS
**It is YOUR responsibilty to bring medication bottles and/or updated medication list to [address] [place] [address]. This will allow us to better serve you and all your healthcare needs**    Please be informed that if you contact our office outside of normal business hours the physician on call cannot help with any scheduling or rescheduling issues, procedure instruction questions or any type of medication issue. We advise you for any urgent/emergency that you go to the nearest emergency room!     PLEASE CALL OUR OFFICE DURING NORMAL BUSINESS HOURS    Monday - Friday   8 am to 5 pm    [place]: [phone]    [place]:  [phone]

## 2021-07-28 NOTE — TELEPHONE ENCOUNTER
Per Flores Manley NP: She previously was on Enalapril 5 mg daily- can resume- stop losartan- monitor bp as dose may need to be adjusted .  Patient notified and will monitor B/P

## 2021-07-29 NOTE — TELEPHONE ENCOUNTER
Called the patient to let her know that her CTA head and neck has been scheudled for 8/9/2021 arrival time 12:30 pm test time 1:00 pm . NPO 4 hrs prior and this test is at [facility] . All information has been left on the patient voicemail for her as well .

## 2021-08-24 NOTE — TELEPHONE ENCOUNTER
pt called and she is asking for a referral to Dr. Quyen Mon at [facility] for her anurysms. She has to aneurysms and they have grown in the last year.  Please advise     Call pt once referral is sent

## 2021-11-08 NOTE — PATIENT INSTRUCTIONS
**It is YOUR responsibilty to bring medication bottles and/or updated medication list to [address] [place] [address]. This will allow us to better serve you and all your healthcare needs**    Please be informed that if you contact our office outside of normal business hours the physician on call cannot help with any scheduling or rescheduling issues, procedure instruction questions or any type of medication issue. We advise you for any urgent/emergency that you go to the nearest emergency room!     PLEASE CALL OUR OFFICE DURING NORMAL BUSINESS HOURS    Monday - Friday   8 am to 5 pm    [place]: [place] 12: [phone]    [place]:  [phone]

## 2021-11-08 NOTE — PROGRESS NOTES
Catherine Funk  is a  Established patient  ,73 y.o.   female here for evaluation of the following chief complaint(s):    cad        SUBJECTIVE/OBJECTIVE:  HPI : h/o  Cad, htn, hyperlipidimea now here has no cardiac comaplins  Await ? neurosurgery    Review of Systems    NEG    Vitals:    11/08/21 1426   BP: 126/72   Site: Left Upper Arm   Position: Sitting   Cuff Size: Medium Adult   Pulse: 75   Weight: 133 lb 9.6 oz (60.6 kg)   Height: 5' 6\" (1.676 m)     /72 (Site: Left Upper Arm, Position: Sitting, Cuff Size: Medium Adult)   Pulse 75   Ht 5' 6\" (1.676 m)   Wt 133 lb 9.6 oz (60.6 kg)   BMI 21.56 kg/m²   Patient-Reported Vitals 6/18/2020   Patient-Reported Weight 145 lbs   Patient-Reported Height 5 6     Wt Readings from Last 3 Encounters:   11/08/21 133 lb 9.6 oz (60.6 kg)   07/14/21 135 lb (61.2 kg)   02/05/21 137 lb (62.1 kg)     Body mass index is 21.56 kg/m². Physical Exam     Neck: JVD      Lungs : clear    Cardio : Si and S2 audilble     Ext: edema      All pertinent data reviewed      Meds : reviewed         Tests ordered        ASSESSMENT/PLAN:               -     CORONARY ARTERY DISEASE:  asymptomatic     All available  tests in chart reviewed. Management discussed . Testing ordered  no                               -  Hypertension: Patients blood pressure is normal. Patient is advised about low sodium diet. Present medical regimen will not be changed. -  LIPID MANAGEMENT:  Importance of lipid levels discussed with patient   and patient was given dietary advice. NCEP- ATP III guidelines reviewed with patient. -   Changes  in medicines made: No                         - HAS IC ANEURYSM    Per neurosurgeon, has  carotid stenosis per CTs      Pt  saw Dr Ye Dillon:  After having a long conversation, patient does want to discuss options with the rest of her family. I felt that this was very reasonable.  Once patient has made a decision as to what direction she wants to move forward with, she is to call the office and let us know. If she does decide to move forward with surgery, she will need to be cleared medically and by her cardiologist. We will have to hold her blood thinning medication prior to surgery and after surgery. Patient acknowledges understanding. I have once again stressed the importance of smoking cessation as smoking has shown to increase risk of aneurysm rupture. She is otherwise controlling her hypertension with medical management. Patient acknowledges understanding of today's conversation. She will inform our office when she has made a decision. Until then, she knows to go to the nearest emergency department or call 911 if she has a severe worse headache of life or any changes in mentation. 1. No acute intracranial abnormality. Mild chronic microvascular ischemic   changes. 2. 70% right and 50% left ICA stenosis by NASCET criteria due to moderate   calcified atherosclerotic plaque. 3. Moderate stenosis in the right axillary artery.  Mild bilateral subclavian   artery stenosis. 4. Mild-to-moderate stenosis at the origin of the left vertebral artery. 5. No significant stenosis or large vessel occlusion of the head. 6. Left MCA bifurcation aneurysm measures 7 x 5 mm. 7. Anterior communicating artery aneurysm measures 4 x 3.5 mm. The findings were sent to the Radiology Results [address] at 7:30   pm on 8/9/2021to be communicated to a licensed caregiver.       RECOMMENDATIONS:   Neurosurgery consultation recommended for intracranial aneurysms. An electronic signature was used to authenticate this note.     --Venice Crawley MD

## 2021-11-08 NOTE — PROGRESS NOTES
Pt.  Denies CP, dizziness, palpitations, edema of legs/feet/ankles. Pt. Affirms SOB on exertion. Brain surgery upcoming on January 31st with Dr. Bela France.

## 2021-11-14 NOTE — PROGRESS NOTES
Mike Do  is a  Established patient  ,73 y.o.   female here for evaluation of the following chief complaint(s):    Cad          SUBJECTIVE/OBJECTIVE:  HPI : h/o  Cad, htn, hyperlipidimea now here  Has no cardiac complains  Review of Systems    NEG    Vitals:    07/14/21 1622   BP: 128/80   Pulse: 81   SpO2: 99%   Weight: 135 lb (61.2 kg)   Height: 5' 6\" (1.676 m)     /80   Pulse 81   Ht 5' 6\" (1.676 m)   Wt 135 lb (61.2 kg)   SpO2 99%   BMI 21.79 kg/m²   Patient-Reported Vitals 6/18/2020   Patient-Reported Weight 145 lbs   Patient-Reported Height 5 6     Wt Readings from Last 3 Encounters:   11/08/21 133 lb 9.6 oz (60.6 kg)   07/14/21 135 lb (61.2 kg)   02/05/21 137 lb (62.1 kg)     Body mass index is 21.79 kg/m². Physical Exam     Neck: JVD      Lungs : clear    Cardio : Si and S2 audilble     Ext: edema      All pertinent data reviewed      Meds : reviewed         Tests ordered        ASSESSMENT/PLAN:               -     CORONARY ARTERY DISEASE:  asymptomatic     All available  tests in chart reviewed. Management discussed . Testing ordered  no                               -  Hypertension: Patients blood pressure is normal. Patient is advised about low sodium diet. Present medical regimen will not be changed. -  LIPID MANAGEMENT:  Importance of lipid levels discussed with patient   and patient was given dietary advice. NCEP- ATP III guidelines reviewed with patient. -   Changes  in medicines made: No                         - HAS IC ANEURYSM    Per neurosurgeon, has  carotid stenosis per CTs  Get ct                               An electronic signature was used to authenticate this note.     --Tsering Sloan MD

## 2021-12-13 NOTE — PROGRESS NOTES
12/14/21    Prasanna Gibbs  1950    Chief Complaint   Patient presents with    Migraine     pt states the migraines have not changed, pt states she gets her migraines by random     Gait Problem     pt states her balance is off when she walks and stands; pt states she has trouble changing positions from sitting to walking    Neck Pain     pt states has trouble holding her head up through out the day       History of Present Illness  Bonnie Ordoñez is a 73 y.o. female presenting [address] office today in follow-up regarding multiple neurologic complaints. At last visit with Dr. Anabella Powers 08/13/2021, she was continued on Mestinon 60 mg 1 tablet 4 times daily for myasthenia was reportedly working well. She was continued on Requip 0.5 mg twice daily for restless leg. She was continued on Emgality and Trokendi for migraine. He was referred to Dr. Trista Zapien at [facility] regarding worsening of her carotid artery stenosis and enlargement of both aneurysms. On exam today, patient tells me that she is taking the Mestinon 2-3 times daily depending on how many times she remembers it. Notes that it does give her GI upset/gas which she is not thrilled about. She does continue to endorse diplopia in the evening when she is watching TV, notes shortness of breath but mostly on exertion and overall general fatigue. She notes that she does have psoriatic and other types of arthritis. I tried to get a better gauge regarding symptoms worsening or if this is in general how they have been as this is my first time meeting the patient and she would endorse worsening of symptoms at one time but then after would say that it is about the same. She told me that she had told her cardiologist about her symptoms and he seemed unconcerned, causing me to question how much of this is chronic versus changes since her last appointment.   She noted an episode of syncope/loss of consciousness does not describe any postictal period, no loss of bladder/bowel function nor biting of the tongue. It sounds more blood pressure related, will hold off any further work-up for seizure/EEG at this time but told her if she has any further episodes to make sure to contact us. Regarding migraine she is continued on Trokendi but is no longer taking the Emgality injections, says her last one was over a month ago. She tells me that she is unable to afford the Emgality through her insurance and only continued on it because Dr. Antonio Quezada had given her samples previously. He also provided her samples of Nurtec abortively, says she still has a few of these and that they work well for her. She has followed up with Dr. Wil Tidwell, neurosurgery regarding worsening of carotid artery stenosis and enlargement of both aneurysm, has surgery scheduled for January 31 to address these. Current Outpatient Medications   Medication Sig Dispense Refill    umeclidinium-vilanterol (ANORO ELLIPTA) 62.5-25 MCG/INH AEPB inhaler INHALE ONE DOSE BY MOUTH DAILY 1 each 5    TROKENDI XR 25 MG CP24       hydroCHLOROthiazide (HYDRODIURIL) 25 MG tablet Take 1 tablet by mouth every morning 30 tablet 5    nitroGLYCERIN (NITROLINGUAL) 0.4 MG/SPRAY 0.4 mg spray Place 1 spray under the tongue every 5 minutes as needed for Chest pain 1 Bottle 3    prasugrel (EFFIENT) 10 MG TABS Take 1 tablet by mouth daily 90 tablet 2    sertraline (ZOLOFT) 100 MG tablet Take 1 tablet by mouth daily 90 tablet 3    rOPINIRole (REQUIP) 0.5 MG tablet Take 1 tablet by mouth 2 times daily 180 tablet 3    tiZANidine (ZANAFLEX) 2 MG tablet Take 2 mg by mouth every 6 hours as needed      clonazePAM (KLONOPIN) 0.5 MG tablet Take 0.5 mg by mouth daily as needed.  HYDROcodone-acetaminophen (NORCO) 5-325 MG per tablet       guaiFENesin (MUCINEX) 600 MG extended release tablet Take 1 tablet by mouth 2 times daily 60 tablet 5    aspirin 81 MG EC tablet Take 81 mg by mouth daily.         nystatin [facility]) 450856 UNIT/GM powder APPLY TO AFFECTED AREA(S) TWO TIMES A DAY 15 g 3    varenicline (CHANTIX STARTING MONTH SUDHIR) 0.5 MG X 11 & 1 MG X 42 tablet TAKE 1 (0.5MG) TAB BY MOUTH DAILY FOR 3 DAYS, THEN TAKE 1 (0.5MG) TAB TWICE DAILY FOR 4 DAYS, THEN TAKE 1 (1MG) TAB TWICE DAILY THEREAFTER (Patient not taking: Reported on 11/8/2021) 1 box 0    UNABLE TO FIND Rx for cane 1 Units 0    Respiratory Therapy Supplies (NEBULIZER AIR TUBE/PLUGS) MISC 1 Tube by Does not apply route 4 times daily (Patient not taking: Reported on 12/14/2021) 1 each 0     No current facility-administered medications for this visit.        Physical Exam:  Also present during visit: None  Mental Status   Orientation: oriented to person, oriented to place, oriented to problem and oriented to time    Mood/affectappropriate mood and appropriate affect   Memory/Other: recent memory intact, remote memory intact, fund of knowledge intact, attention span normal and concentration normal  Language  Language: (normal) language, no dysarthria, (normal) articulation and no dysphasia/aphasia  Cranial Nerves   Eyes: pupils normal size and reactive to light and visual fields appear full   CN III, IV, VI : extraocular muscle strength normal, normal pursuit, no nystagmus, no ptosis and she notes pain with eye movements says this is new   Facial Motor: normal facial motor   CN XII: tongue protrudes midline  Motor/Coordination Exam   Power: no arm drift, normal tone and Generalized weakness in all extremities   Coordination: normal finger-to-nose, forearm rotation intact and movements are slow particularly in b/l hands notes hx of arthritis  Sensory Exam No Bradykinesia, No Dyskindesia and No myoclonus light touch normal, decreased pinprick in the left and right lower extremities, decreased vibration in bilateral lower extremities severe  Reflexes biceps 3/4, triceps 3/4, patellar 3/4, Achilles 3/4 bilaterally     Gait and Stance   Gait/Posture: Relates independently, at [address] [place] [address], she had conventional angiogram completed. Plan is for surgical intervention scheduled for January 31. Medications prescribed for the patient were discussed in detail. This included a discussion of the potential risk versus the potential benefits of the medications. The patient was given time to ask questions and they were answered to the best my ability. The patient appeared to understand the information provided. The patient has scheduled follow-up with Dr. Shannon Friend 02/16/2022. Thank you for allowing us to participate in the care of your patient if we can be of further assistance or any questions arise, please not hesitate to contact us.       NOEMI Longoria

## 2021-12-15 NOTE — TELEPHONE ENCOUNTER
I called and spoke to Richie Pretty and let her know the message below. She isn't crazy about steroids but will think about it and pick it up if she decides to try it.

## 2021-12-15 NOTE — TELEPHONE ENCOUNTER
Please let patient know that I discussed her symptoms with Dr. Anjelica Zhao. He is recommending a pulse of steroids to see if symptoms improve. Will send a script for prednisone 10 mg daily for 2 weeks and let us know if symptoms of double vision, shortness of breath, problems swallowing improve.  Thank you

## 2022-01-01 ENCOUNTER — TELEPHONE (OUTPATIENT)
Dept: CARDIOLOGY CLINIC | Age: 72
End: 2022-01-01

## 2022-01-04 NOTE — TELEPHONE ENCOUNTER
Cardiologist: Dr. Saad Thompson  Surgeon: Dr. Marina Thorpe  Surgery: Pterional craniotomy, aneurysm clipping  Anesthesia: General  Date: 1/31/2022  FAX# [phone]  # -[phone]    Last OV 7/14/2021 w/Damir       -     CORONARY ARTERY DISEASE:  asymptomatic     All available  tests in chart reviewed. Management discussed . Testing ordered  no                               -  Hypertension: Patients blood pressure is normal. Patient is advised about low sodium diet. Present medical regimen will not be changed. -  LIPID MANAGEMENT:  Importance of lipid levels discussed with patient   and patient was given dietary advice. NCEP- ATP III guidelines reviewed with patient. -   Changes  in medicines made: No           NM- 9/6/2019   Normal Stress nuclear scintigraphic study suggestive of normal myocardial    perfusion.    Gated images demonstrate normal left ventricular systolic function with EF    of 60 %. Echo- 9/6/2019   Normal left ventricle structure and systolic function with an ejection   fraction of 55-60%. No significant valvular disease noted. Normal pulmonary artery pressure. No evidence of pericardial effusion. Essentially normal echo.       Cath- 3/8/2018   LAD PCI   RCA PCI DIFFICULT NEEDEED GUIDELINER      NORMAL EF   CX 50% 2 LOCATIONS      Recommendations   DAPT FOR ONE YEAR        Effient    Aspirin

## 2023-01-27 NOTE — TELEPHONE ENCOUNTER
Left message for patient, per Tomas Rodriguez NP, stop Enalapril and start HCTZ 25mg daily
Patient called to discuss her medications  She is having a problem urinating , feet swell   A lot in the pm
Patient started on Enalapril and states she hasn't felt good since. Doesn't urinate as often as she should, has swelling in feet that worsen throughout the day. Current b/p 140/80, hr 70's.  Patient states she does not want switched to Losartan - caused anai horses in past.
n/a